# Patient Record
Sex: FEMALE | Race: WHITE | Employment: FULL TIME | URBAN - NONMETROPOLITAN AREA
[De-identification: names, ages, dates, MRNs, and addresses within clinical notes are randomized per-mention and may not be internally consistent; named-entity substitution may affect disease eponyms.]

---
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
## 2020-01-13 ENCOUNTER — TELEPHONE (OUTPATIENT)
Dept: PSYCHIATRY | Age: 20
End: 2020-01-13

## 2020-01-13 ENCOUNTER — OFFICE VISIT (OUTPATIENT)
Dept: FAMILY MEDICINE CLINIC | Age: 20
End: 2020-01-13
Payer: COMMERCIAL

## 2020-01-13 VITALS
HEIGHT: 66 IN | RESPIRATION RATE: 12 BRPM | TEMPERATURE: 98.9 F | HEART RATE: 90 BPM | BODY MASS INDEX: 32.14 KG/M2 | WEIGHT: 200 LBS | DIASTOLIC BLOOD PRESSURE: 80 MMHG | SYSTOLIC BLOOD PRESSURE: 138 MMHG

## 2020-01-13 PROCEDURE — 99213 OFFICE O/P EST LOW 20 MIN: CPT | Performed by: NURSE PRACTITIONER

## 2020-01-13 RX ORDER — PAROXETINE HYDROCHLORIDE 20 MG/1
20 TABLET, FILM COATED ORAL DAILY
Qty: 30 TABLET | Refills: 3 | Status: SHIPPED | OUTPATIENT
Start: 2020-01-13 | End: 2020-02-10 | Stop reason: SDUPTHER

## 2020-01-13 NOTE — PROGRESS NOTES
24 tablet 0     No current facility-administered medications for this visit. Orders Placed This Encounter   Medications    PARoxetine (PAXIL) 20 MG tablet     Sig: Take 1 tablet by mouth daily     Dispense:  30 tablet     Refill:  3         All medications reviewed and reconciled, including OTC and herbal medications. Updated list given to patient. Patient Active Problem List   Diagnosis    Major depressive disorder, recurrent episode, moderate (HCC)    Attention deficit hyperactivity disorder (ADHD), predominantly inattentive type    Generalized anxiety disorder       Past Medical History:   Diagnosis Date    ADHD (attention deficit hyperactivity disorder)     Anxiety     Attention deficit hyperactivity disorder (ADHD), predominantly inattentive type 1/16/2018    Blood in stool     Convergent comitant strabismus 2014    Diarrhea        No past surgical history on file.     No Known Allergies    Social History     Socioeconomic History    Marital status: Single     Spouse name: Not on file    Number of children: Not on file    Years of education: Not on file    Highest education level: Not on file   Occupational History    Not on file   Social Needs    Financial resource strain: Not on file    Food insecurity:     Worry: Not on file     Inability: Not on file    Transportation needs:     Medical: Not on file     Non-medical: Not on file   Tobacco Use    Smoking status: Never Smoker    Smokeless tobacco: Never Used   Substance and Sexual Activity    Alcohol use: No    Drug use: No    Sexual activity: Yes     Partners: Male     Comment: uses condoms   Lifestyle    Physical activity:     Days per week: Not on file     Minutes per session: Not on file    Stress: Not on file   Relationships    Social connections:     Talks on phone: Not on file     Gets together: Not on file     Attends Yarsanism service: Not on file     Active member of club or organization: Not on file     Attends meetings of clubs or organizations: Not on file     Relationship status: Not on file    Intimate partner violence:     Fear of current or ex partner: Not on file     Emotionally abused: Not on file     Physically abused: Not on file     Forced sexual activity: Not on file   Other Topics Concern    Not on file   Social History Narrative    Not on file       Family History   Problem Relation Age of Onset    Depression Mother     No Known Problems Father     Allergy (Severe) Maternal Grandmother     Other Maternal Grandmother         fibromyalgia    Depression Maternal Grandmother     Cancer Paternal Grandmother         Pancreatic     Colon Cancer Neg Hx     Colon Polyps Neg Hx          I have reviewed the patient's past medical history, past surgical history, allergies, medications, social and family history and I have made updates where appropriate.       Review of Systems  Positive responses are highlighted in bold    Constitutional:  Fever, Chills, Night Sweats, Fatigue, Unexpected changes in weight  Eyes:  Eye discharge, Eye pain, Eye redness, Visual disturbances   HENT:  Ear pain, Tinnitus, Nosebleeds, Trouble swallowing, Hearing loss, Sore throat  Cardiovascular:  Chest Pain, Palpitations, Orthopnea, Paroxysmal Nocturnal Dyspnea  Respiratory:  Cough, Wheezing, Shortness of breath, Chest tightness, Apnea  Gastrointestinal:  Nausea, Vomiting, Diarrhea, Constipation, Heartburn, Blood in stool  Genitourinary:  Difficulty or painful urination, Flank pain, Change in frequency, Urgency  Skin:  Color change, Rash, Itching, Wound  Psychiatric:  Hallucinations, Anxiety, Depression, Suicidal ideation  Hematological:  Enlarged glands, Easy bleeding, Easily bruising  Musculoskeletal:  Joint pain, Back pain, Gait problems, Joint swelling, Myalgias  Neurological:  Dizziness, Headaches, Presyncope, Numbness, Seizures, Tremors  Allergy:  Environmental allergies, Food allergies  Endocrine:  Heat Intolerance, Cold Intolerance, Polydipsia, Polyphagia, Polyuria      PHYSICAL EXAM:  Vitals:    01/13/20 1500   BP: 138/80   Pulse: 90   Resp: 12   Temp: 98.9 °F (37.2 °C)   TempSrc: Oral   Weight: 200 lb (90.7 kg)   Height: 5' 6\" (1.676 m)     Body mass index is 32.28 kg/m². VS Reviewed  General Appearance: A&O x 3, No acute distress,well developed and well- nourished  Head: normocephalic and atraumatic  Eyes: pupils equal, round, and reactive to light, extraocular eye movements intact, conjunctivae and eye lids without erythema  Neck: supple and non-tender without mass, no thyromegaly or thyroid nodules, no cervical lymphadenopathy  Pulmonary/Chest: clear to auscultation bilaterally- no wheezes, rales or rhonchi, normal air movement, no respiratory distress or retractions  Cardiovascular: S1 and S2 auscultated w/ RRR. No murmurs, rubs, clicks, or gallops, distal pulses intact. Abdomen: soft, non-tender, non-distended, bowl sounds physiologic,  no rebound or guarding, no masses or hernias noted. Liver and spleen without enlargement. Extremities: no cyanosis, clubbing or edema of the lower extremities  Musculoskeletal: No joint swelling or gross deformity   Neuro:  Alert, 5/5 strength globally and symmetrically  Psych: Affect and mood are flat. Thought process is normal without evidence of psychosis. Good insight and appropriate interaction. Cognition and memory appear to be intact. Skin: warm and dry, no rash or erythema  Lymph:  No cervical, auricular or supraclavicular lymph nodes palpated    ASSESSMENT & PLAN  Jolly Padilla was seen today for follow-up. Diagnoses and all orders for this visit:    Mood swings    Generalized anxiety disorder  -     PARoxetine (PAXIL) 20 MG tablet; Take 1 tablet by mouth daily    Severe episode of recurrent major depressive disorder, without psychotic features (HCC)  -     PARoxetine (PAXIL) 20 MG tablet;  Take 1 tablet by mouth daily      - not convinced she actually has mood disorder  -

## 2020-01-14 NOTE — TELEPHONE ENCOUNTER
I have attempted to leave a VM but there are calling restrictions and I was not able to leave a message; I will try again later.

## 2020-01-14 NOTE — TELEPHONE ENCOUNTER
Yes, I will only need 60 min for the new encounter with [facility] since Dr. Whelan Hasting evaluated her recently.   Thanks,  Electronically signed by Collette Catchings, MD on 1/14/2020 at 9:25 AM

## 2020-01-14 NOTE — TELEPHONE ENCOUNTER
Yes, I think Dr. Dru Durham and I have both already agreed to this.  Fine with me if it's fine with Dr. Dru Durham

## 2020-02-10 ENCOUNTER — OFFICE VISIT (OUTPATIENT)
Dept: FAMILY MEDICINE CLINIC | Age: 20
End: 2020-02-10
Payer: COMMERCIAL

## 2020-02-10 VITALS
RESPIRATION RATE: 10 BRPM | WEIGHT: 189 LBS | HEIGHT: 64 IN | SYSTOLIC BLOOD PRESSURE: 126 MMHG | DIASTOLIC BLOOD PRESSURE: 68 MMHG | TEMPERATURE: 98.5 F | BODY MASS INDEX: 32.27 KG/M2 | HEART RATE: 80 BPM

## 2020-02-10 PROCEDURE — 99213 OFFICE O/P EST LOW 20 MIN: CPT | Performed by: NURSE PRACTITIONER

## 2020-02-10 RX ORDER — PAROXETINE HYDROCHLORIDE 20 MG/1
20 TABLET, FILM COATED ORAL DAILY
Qty: 90 TABLET | Refills: 1 | Status: SHIPPED | OUTPATIENT
Start: 2020-02-10 | End: 2020-05-07

## 2020-02-10 NOTE — PROGRESS NOTES
Chief Complaint   Patient presents with    1 Month Follow-Up     anxiety- getting better       History obtained from chart review and the patient. SUBJECTIVE:  Cristino Wise is a 23 y.o. female that presents today for anxiety and depression      Anxiety/MDD    Mood swings are minimal. Anxiety flares up occasionally, but not bad. She denies any SI/HI. She feels like things are manageable. Depression is doing ok, denies feeling depressed. Energy and motivation are doing well. Appetite is fair, sleeping ok. Work and school are going well. Age/Gender Health Maintenance    Lipid - 35  DM Screen - 35  Colon Cancer Screening - 48  Lung Cancer Screening (Age 54 to [de-identified] with 30 pack year hx, current smoker or quit within past 15 years) - n/a    Tetanus - needs  Influenza Vaccine - 10/18  Pneumonia Vaccine - 65  Zostavax - 50   HPV Vaccine - needs    Breast Cancer Screening - 50  Cervical Cancer Screening - 21  Osteoporosis Screening - 65  Chlamydia Screen - 16    PSA testing discussion - n/a  AAA Screening - n/a    Falls screening - n/a    Current Outpatient Medications   Medication Sig Dispense Refill    PARoxetine (PAXIL) 20 MG tablet Take 1 tablet by mouth daily 90 tablet 1    ibuprofen (ADVIL;MOTRIN) 200 MG tablet Take 200 mg by mouth every 6 hours as needed for Pain      Naproxen Sodium (ALEVE PO) Take by mouth as needed      clonazePAM (KLONOPIN) 0.5 MG tablet Take 0.5 tablet to 1 full tablet daily prn for panic attacks. 5 tablet 0    naproxen (NAPROSYN) 500 MG tablet Take 1 tablet by mouth 2 times daily (with meals) for 24 doses 24 tablet 0     No current facility-administered medications for this visit. Orders Placed This Encounter   Medications    PARoxetine (PAXIL) 20 MG tablet     Sig: Take 1 tablet by mouth daily     Dispense:  90 tablet     Refill:  1         All medications reviewed and reconciled, including OTC and herbal medications. Updated list given to patient.        Patient normocephalic and atraumatic  Eyes: pupils equal, round, and reactive to light, extraocular eye movements intact, conjunctivae and eye lids without erythema  Neck: supple and non-tender without mass, no thyromegaly or thyroid nodules, no cervical lymphadenopathy  Pulmonary/Chest: clear to auscultation bilaterally- no wheezes, rales or rhonchi, normal air movement, no respiratory distress or retractions  Cardiovascular: S1 and S2 auscultated w/ RRR. No murmurs, rubs, clicks, or gallops, distal pulses intact. Abdomen: soft, non-tender, non-distended, bowl sounds physiologic,  no rebound or guarding, no masses or hernias noted. Liver and spleen without enlargement. Extremities: no cyanosis, clubbing or edema of the lower extremities  Musculoskeletal: No joint swelling or gross deformity   Neuro:  Alert, 5/5 strength globally and symmetrically  Psych: Affect and mood are flat. Thought process is normal without evidence of psychosis. Good insight and appropriate interaction. Cognition and memory appear to be intact. Skin: warm and dry, no rash or erythema  Lymph:  No cervical, auricular or supraclavicular lymph nodes palpated    ASSESSMENT & PLAN  Dharmesh Solomon was seen today for 1 month follow-up. Diagnoses and all orders for this visit:    Recurrent major depressive disorder, in full remission ([address]    Generalized anxiety disorder  -     PARoxetine (PAXIL) 20 MG tablet; Take 1 tablet by mouth daily      - anxiety and depression symptoms much improved and are stable  - con't Paxil 20 mg  - has appt with Psych this week      DISPOSITION    Return in about 6 months (around 8/10/2020) for anxiety and depression. Susan released without restrictions. PATIENT COUNSELING    Counseling was provided today regarding the following topics: Healthy eating habits, Regular exercise, substance abuse and healthy sleep habits.     Susan received counseling on the following healthy behaviors: nutrition    Patient given

## 2020-03-12 ENCOUNTER — OFFICE VISIT (OUTPATIENT)
Dept: FAMILY MEDICINE CLINIC | Age: 20
End: 2020-03-12
Payer: COMMERCIAL

## 2020-03-12 VITALS
BODY MASS INDEX: 29.47 KG/M2 | HEIGHT: 66 IN | SYSTOLIC BLOOD PRESSURE: 118 MMHG | HEART RATE: 88 BPM | TEMPERATURE: 98.7 F | WEIGHT: 183.4 LBS | DIASTOLIC BLOOD PRESSURE: 60 MMHG | RESPIRATION RATE: 16 BRPM

## 2020-03-12 PROCEDURE — 99213 OFFICE O/P EST LOW 20 MIN: CPT | Performed by: NURSE PRACTITIONER

## 2020-03-12 RX ORDER — AZITHROMYCIN 250 MG/1
250 TABLET, FILM COATED ORAL SEE ADMIN INSTRUCTIONS
Qty: 6 TABLET | Refills: 0 | Status: SHIPPED | OUTPATIENT
Start: 2020-03-12 | End: 2020-03-17

## 2020-05-06 ENCOUNTER — TELEPHONE (OUTPATIENT)
Dept: FAMILY MEDICINE CLINIC | Age: 20
End: 2020-05-06

## 2020-05-06 NOTE — TELEPHONE ENCOUNTER
Patient has been having trouble sleeping for the last week because of nightmares. Please advise.   [phone]

## 2020-05-07 ENCOUNTER — TELEMEDICINE (OUTPATIENT)
Dept: FAMILY MEDICINE CLINIC | Age: 20
End: 2020-05-07
Payer: COMMERCIAL

## 2020-05-07 PROCEDURE — 99214 OFFICE O/P EST MOD 30 MIN: CPT | Performed by: NURSE PRACTITIONER

## 2020-05-07 RX ORDER — CLONAZEPAM 0.5 MG/1
0.5 TABLET ORAL NIGHTLY PRN
Qty: 14 TABLET | Refills: 0 | Status: SHIPPED | OUTPATIENT
Start: 2020-05-07 | End: 2021-12-16 | Stop reason: ALTCHOICE

## 2020-05-07 RX ORDER — PAROXETINE 30 MG/1
30 TABLET, FILM COATED ORAL DAILY
Qty: 90 TABLET | Refills: 0 | Status: SHIPPED | OUTPATIENT
Start: 2020-05-07 | End: 2020-08-14 | Stop reason: SDUPTHER

## 2020-05-07 ASSESSMENT — ENCOUNTER SYMPTOMS
COUGH: 0
ALLERGIC/IMMUNOLOGIC NEGATIVE: 1
RHINORRHEA: 0
EYES NEGATIVE: 1
EYE REDNESS: 0
COLOR CHANGE: 0
RESPIRATORY NEGATIVE: 1
TROUBLE SWALLOWING: 0
DIARRHEA: 0
ABDOMINAL PAIN: 0
GASTROINTESTINAL NEGATIVE: 1
SHORTNESS OF BREATH: 0
SORE THROAT: 0
NAUSEA: 0
VOMITING: 0
EYE PAIN: 0
WHEEZING: 0

## 2020-05-07 NOTE — PROGRESS NOTES
2020    TELEHEALTH EVALUATION -- Audio/Visual (During GJPDY-90 public health emergency)    HPI:    Yakov Arellano (:  2000) has requested an audio/video evaluation for the following concern(s):    Night terrors  Started last week with some mild bad dreams and have progressively worsened over the last week to full blown night terrors. She reports that they were so bad she's couldn't go to sleep one night until 10 AM. She reports that the one she actually woke up into a panic attack. The one that was so bad she dreamt she and her family were in a bad car accident. Feels like her anxiety the past few weeks has gotten a little worse. She has been working on finals with school and so it has been a little stressful, but no major panic attacks. Focus/concentration has been good. Motivation/energy have been a little low. She has been feeling a little more depressed the last few weeks because she's been \"cooped\" up so much with quarantine with COVID. Feeling depressed pretty much 6 days/week, not necessarily the whole day, but parts of the day. Struggling with some suicidal thoughts maybe once a week. She will usually call her boyfriend and he talks her through it and calms her down. No plan. Review of Systems   Constitutional: Negative. Negative for chills, diaphoresis and fatigue. HENT: Negative. Negative for congestion, rhinorrhea, sore throat and trouble swallowing. Eyes: Negative. Negative for pain, redness and visual disturbance. Respiratory: Negative. Negative for cough, shortness of breath and wheezing. Cardiovascular: Negative. Negative for chest pain. Gastrointestinal: Negative. Negative for abdominal pain, diarrhea, nausea and vomiting. Endocrine: Negative. Negative for polydipsia, polyphagia and polyuria. Genitourinary: Negative. Negative for decreased urine volume, dysuria, frequency and urgency. Musculoskeletal: Negative. Negative for arthralgias and myalgias. inattentive type 1/16/2018    Blood in stool     Convergent comitant strabismus 2014    Diarrhea    , No past surgical history on file.,   Family History   Problem Relation Age of Onset    Depression Mother     No Known Problems Father     Allergy (Severe) Maternal Grandmother     Other Maternal Grandmother         fibromyalgia    Depression Maternal Grandmother     Cancer Paternal Grandmother         Pancreatic     Colon Cancer Neg Hx     Colon Polyps Neg Hx    ,   There is no immunization history on file for this patient.,   Health Maintenance   Topic Date Due    Varicella vaccine (1 of 2 - 2-dose childhood series) 10/12/2001    HPV vaccine (1 - 2-dose series) 10/12/2011    HIV screen  10/12/2015    Chlamydia screen  10/12/2016    DTaP/Tdap/Td vaccine (1 - Tdap) 10/12/2019    Flu vaccine (Season Ended) 09/01/2020    Hepatitis A vaccine  Aged Out    Hepatitis B vaccine  Aged Out    Hib vaccine  Aged Out    Meningococcal (ACWY) vaccine  Aged Out    Pneumococcal 0-64 years Vaccine  Aged Out       PHYSICAL EXAMINATION:  [ INSTRUCTIONS:  \"[x]\" Indicates a positive item  \"[]\" Indicates a negative item  -- DELETE ALL ITEMS NOT EXAMINED]  Vital Signs: (As obtained by patient/caregiver or practitioner observation)    Blood pressure-  Heart rate-    Respiratory rate-    Temperature-  Pulse oximetry-     Constitutional: [x] Appears well-developed and well-nourished [x] No apparent distress      [] Abnormal-   Mental status  [x] Alert and awake  [x] Oriented to person/place/time [x]Able to follow commands      Eyes:  EOM    [x]  Normal  [] Abnormal-  Sclera  [x]  Normal  [] Abnormal -         Discharge [x]  None visible  [] Abnormal -    HENT:   [x] Normocephalic, atraumatic.   [] Abnormal   [x] Mouth/Throat: Mucous membranes are moist.     External Ears [x] Normal  [] Abnormal-     Neck: [x] No visualized mass     Pulmonary/Chest: [x] Respiratory effort normal.  [x] No visualized signs of difficulty breathing or respiratory distress        [] Abnormal-      Musculoskeletal:   [x] Normal gait with no signs of ataxia         [x] Normal range of motion of neck        [] Abnormal-       Neurological:        [x] No Facial Asymmetry (Cranial nerve 7 motor function) (limited exam to video visit)          [x] No gaze palsy        [] Abnormal-         Skin:        [x] No significant exanthematous lesions or discoloration noted on facial skin         [] Abnormal-            Psychiatric:       [] Normal Affect [x] No Hallucinations        [x] Abnormal- flat affect    Other pertinent observable physical exam findings-     ASSESSMENT/PLAN:  1. Generalized anxiety disorder  - increase dose of Paxil add short term Rx for Klonopin for sleep/night terrors etc  - PARoxetine (PAXIL) 30 MG tablet; Take 1 tablet by mouth daily  Dispense: 90 tablet; Refill: 0  - clonazePAM (KLONOPIN) 0.5 MG tablet; Take 1 tablet by mouth nightly as needed (panic attack and/or sleep) for up to 14 days. Dispense: 14 tablet; Refill: 0    2. Major depressive disorder, recurrent episode, moderate (HCC)  - increase dose of Paxil to 30 mg  - suicidal precautions given  - discussed referral back to [facility] for counseling, but she declines at this time  - PARoxetine (PAXIL) 30 MG tablet; Take 1 tablet by mouth daily  Dispense: 90 tablet; Refill: 0    3. Night terrors  - clonazePAM (KLONOPIN) 0.5 MG tablet; Take 1 tablet by mouth nightly as needed (panic attack and/or sleep) for up to 14 days. Dispense: 14 tablet; Refill: 0    Controlled Substance Monitoring:    Acute and Chronic Pain Monitoring:   RX Monitoring 5/7/2020   Attestation -   Periodic Controlled Substance Monitoring No signs of potential drug abuse or diversion identified. Return in about 4 weeks (around 6/4/2020) for MDD, anxiety, night terrors. Shyanne Edwards is a 23 y.o. female being evaluated by a Virtual Visit (video visit) encounter to address concerns as mentioned above.   SOFIYA

## 2020-05-15 ENCOUNTER — HOSPITAL ENCOUNTER (OUTPATIENT)
Age: 20
Discharge: HOME | End: 2020-05-15
Payer: COMMERCIAL

## 2020-05-15 LAB
ANION GAP SERPL CALCULATED.3IONS-SCNC: 12 MEQ/L (ref 8–16)
AVERAGE GLUCOSE: 114 MG/DL (ref 70–126)
BUN BLDV-MCNC: 15 MG/DL (ref 7–22)
CALCIUM SERPL-MCNC: 9 MG/DL (ref 8.5–10.5)
CHLORIDE BLD-SCNC: 101 MEQ/L (ref 98–111)
CO2: 26 MEQ/L (ref 23–33)
CREAT SERPL-MCNC: 0.7 MG/DL (ref 0.4–1.2)
GLUCOSE BLD-MCNC: 96 MG/DL (ref 70–108)
HBA1C MFR BLD: 5.8 % (ref 4.4–6.4)
POTASSIUM SERPL-SCNC: 4.3 MEQ/L (ref 3.5–5.2)
SODIUM BLD-SCNC: 139 MEQ/L (ref 135–145)

## 2020-05-15 PROCEDURE — 83036 HEMOGLOBIN GLYCOSYLATED A1C: CPT

## 2020-05-15 PROCEDURE — 83525 ASSAY OF INSULIN: CPT

## 2020-05-15 PROCEDURE — 80048 BASIC METABOLIC PNL TOTAL CA: CPT

## 2020-05-15 PROCEDURE — 36415 COLL VENOUS BLD VENIPUNCTURE: CPT

## 2020-05-16 LAB — INSULIN, RANDOM OR FASTING: NORMAL

## 2020-08-14 RX ORDER — PAROXETINE 30 MG/1
TABLET, FILM COATED ORAL
Qty: 90 TABLET | Refills: 0 | Status: SHIPPED | OUTPATIENT
Start: 2020-08-14 | End: 2021-02-02 | Stop reason: ALTCHOICE

## 2020-10-20 ENCOUNTER — VIRTUAL VISIT (OUTPATIENT)
Dept: FAMILY MEDICINE CLINIC | Age: 20
End: 2020-10-20
Payer: COMMERCIAL

## 2020-10-20 PROCEDURE — 99213 OFFICE O/P EST LOW 20 MIN: CPT | Performed by: NURSE PRACTITIONER

## 2020-10-20 RX ORDER — VENLAFAXINE HYDROCHLORIDE 37.5 MG/1
37.5 CAPSULE, EXTENDED RELEASE ORAL DAILY
Qty: 30 CAPSULE | Refills: 2 | Status: SHIPPED | OUTPATIENT
Start: 2020-10-20 | End: 2021-02-02 | Stop reason: ALTCHOICE

## 2020-10-20 ASSESSMENT — ENCOUNTER SYMPTOMS
ABDOMINAL PAIN: 0
COLOR CHANGE: 0
VOMITING: 0
TROUBLE SWALLOWING: 0
DIARRHEA: 0
EYE REDNESS: 0
SORE THROAT: 0
SHORTNESS OF BREATH: 0
NAUSEA: 0
RHINORRHEA: 0
EYE PAIN: 0
COUGH: 0
WHEEZING: 0

## 2020-10-20 NOTE — PROGRESS NOTES
10/20/2020    TELEHEALTH EVALUATION -- Audio/Visual (During EEAVK-91 public health emergency)    HPI:    Zeke Lee (:  2000) has requested an audio/video evaluation for the following concern(s):    MDD  Feeling more depressed since about July. Her parents moved, her dad got a job in [facility]. She is still living in [facility], going to college at [employer]. School has been stressful. Feeling depressed pretty much daily. When she's not feeling depressed, she feels guilty for being happy. Energy and motivation are poor. She just wants to sleep. Focus/concentration is fair. She struggles falling asleep. Appetite has been ok. Denies any SI/HI. She does have adequate emotional support, she has friends and boyfriend. She was taking Paxil 30 mg, but she felt like it stopped working. So she stopped it as of August.    Also struggling with anxiety, triggers are mostly school and work. She tried setting up counseling, but due to work and school schedule she is very limited. Review of Systems   Constitutional: Positive for fatigue. Negative for chills and diaphoresis. HENT: Negative for congestion, rhinorrhea, sore throat and trouble swallowing. Eyes: Negative for pain, redness and visual disturbance. Respiratory: Negative for cough, shortness of breath and wheezing. Cardiovascular: Negative for chest pain, palpitations and leg swelling. Gastrointestinal: Negative for abdominal pain, diarrhea, nausea and vomiting. Endocrine: Negative for polydipsia, polyphagia and polyuria. Genitourinary: Negative for decreased urine volume, dysuria, frequency and urgency. Musculoskeletal: Negative for arthralgias and myalgias. Skin: Negative for color change and rash. Allergic/Immunologic: Negative for environmental allergies, food allergies and immunocompromised state. Neurological: Negative for dizziness, tremors, syncope and headaches. Hematological: Negative. Negative for adenopathy.    Psychiatric/Behavioral: Positive for decreased concentration and sleep disturbance. Negative for behavioral problems, confusion, self-injury and suicidal ideas. The patient is nervous/anxious. All other systems reviewed and are negative. Prior to Visit Medications    Medication Sig Taking? Authorizing Provider   venlafaxine (EFFEXOR XR) 37.5 MG extended release capsule Take 1 capsule by mouth daily Yes ALBERTA Jaimes CNP   PARoxetine (PAXIL) 30 MG tablet Take 1 tablet by mouth once daily  ALBERTA Jaimes CNP   clonazePAM (KLONOPIN) 0.5 MG tablet Take 1 tablet by mouth nightly as needed (panic attack and/or sleep) for up to 14 days.   ALBERTA Jaimes CNP   ibuprofen (ADVIL;MOTRIN) 200 MG tablet Take 200 mg by mouth every 6 hours as needed for Pain  Historical Provider, MD   Naproxen Sodium (ALEVE PO) Take by mouth as needed  Historical Provider, MD   naproxen (NAPROSYN) 500 MG tablet Take 1 tablet by mouth 2 times daily (with meals) for 24 doses  Ofelia Chahal MD   escitalopram (LEXAPRO) 10 MG tablet Take 1 tablet by mouth daily  ALBERTA Jaimes CNP   SUMAtriptan (IMITREX) 50 MG tablet Take 1 tablet by mouth once as needed for Migraine  ALBERTA Jaimes CNP   propranolol (INDERAL LA) 60 MG extended release capsule Take 1 capsule by mouth daily  ALBERTA Jaimes CNP   amitriptyline (ELAVIL) 10 MG tablet Take 1 tablet by mouth nightly  ALBERTA Jaimes CNP   FLUoxetine (PROZAC) 40 MG capsule Take 1 capsule by mouth daily  ALBERTA Jaimes CNP       Social History     Tobacco Use    Smoking status: Never Smoker    Smokeless tobacco: Never Used   Substance Use Topics    Alcohol use: No    Drug use: No        No Known Allergies,   Past Medical History:   Diagnosis Date    ADHD (attention deficit hyperactivity disorder)     Anxiety     Attention deficit hyperactivity disorder (ADHD), predominantly inattentive type 1/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    Blood in stool     Convergent comitant strabismus 2014    Diarrhea    , No past surgical history on file.,   Family History   Problem Relation Age of Onset    Depression Mother     No Known Problems Father     Allergy (Severe) Maternal Grandmother     Other Maternal Grandmother         fibromyalgia    Depression Maternal Grandmother     Cancer Paternal Grandmother         Pancreatic     Colon Cancer Neg Hx     Colon Polyps Neg Hx    ,   There is no immunization history on file for this patient.,   Health Maintenance   Topic Date Due    Varicella vaccine (1 of 2 - 2-dose childhood series) 10/12/2001    HPV vaccine (1 - 2-dose series) 10/12/2011    HIV screen  10/12/2015    Chlamydia screen  10/12/2016    DTaP/Tdap/Td vaccine (1 - Tdap) 10/12/2019    Flu vaccine (1) 09/01/2020    A1C test (Diabetic or Prediabetic)  05/15/2021    Hepatitis A vaccine  Aged Out    Hepatitis B vaccine  Aged Out    Hib vaccine  Aged Out    Meningococcal (ACWY) vaccine  Aged Out    Pneumococcal 0-64 years Vaccine  Aged Out       PHYSICAL EXAMINATION:  [ INSTRUCTIONS:  \"[x]\" Indicates a positive item  \"[]\" Indicates a negative item  -- DELETE ALL ITEMS NOT EXAMINED]  Vital Signs: (As obtained by patient/caregiver or practitioner observation)    Blood pressure-  Heart rate-    Respiratory rate-    Temperature-  Pulse oximetry-     Constitutional: [x] Appears well-developed and well-nourished [x] No apparent distress      [] Abnormal-   Mental status  [x] Alert and awake  [x] Oriented to person/place/time [x]Able to follow commands      Eyes:  EOM    [x]  Normal  [] Abnormal-  Sclera  [x]  Normal  [] Abnormal -         Discharge [x]  None visible  [] Abnormal -    HENT:   [x] Normocephalic, atraumatic.   [] Abnormal   [x] Mouth/Throat: Mucous membranes are moist.     External Ears [x] Normal  [] Abnormal-     Neck: [x] No visualized mass     Pulmonary/Chest: [x] Respiratory effort normal.  [x] No visualized signs of difficulty breathing or respiratory distress        [] Abnormal-      Musculoskeletal:   [x] Normal gait with no signs of ataxia         [x] Normal range of motion of neck        [] Abnormal-       Neurological:        [x] No Facial Asymmetry (Cranial nerve 7 motor function) (limited exam to video visit)          [x] No gaze palsy        [] Abnormal-         Skin:        [x] No significant exanthematous lesions or discoloration noted on facial skin         [] Abnormal-            Psychiatric:       [x] Normal Affect [x] No Hallucinations        [] Abnormal-     Other pertinent observable physical exam findings-     ASSESSMENT & PLAN  Diagnoses and all orders for this visit:    Generalized anxiety disorder  -     venlafaxine (EFFEXOR XR) 37.5 MG extended release capsule; Take 1 capsule by mouth daily    Severe episode of recurrent major depressive disorder, without psychotic features (HCC)  -     venlafaxine (EFFEXOR XR) 37.5 MG extended release capsule; Take 1 capsule by mouth daily      - start Effexor for anxiety/depression  - suicidal precautions given  - f/u 4 weeks    Return in about 4 weeks (around 11/17/2020) for anxiety and depression. Bartholome Closs is a 21 y.o. female being evaluated by a Virtual Visit (video visit) encounter to address concerns as mentioned above. A caregiver was present when appropriate. Due to this being a TeleHealth encounter (During [place] [address] public [facility] emergency), evaluation of the following organ systems was limited: Vitals/Constitutional/EENT/Resp/CV/GI//MS/Neuro/Skin/Heme-Lymph-Imm. Pursuant to the emergency declaration under the [facility], [address] [place] [address] authority and the [employer] Act, this Virtual Visit was conducted with patient's (and/or legal guardian's) consent, to reduce the patient's risk of exposure to COVID-19 and provide necessary medical care.   The patient (and/or legal guardian) has also been advised to contact this office for worsening conditions or problems, and seek emergency medical treatment and/or call 911 if deemed necessary. Services were provided through a video synchronous discussion virtually to substitute for in-person clinic visit. Patient and provider were located at their individual homes. --ALBERTA Slade - CNP on 10/20/2020 at 11:38 AM    An electronic signature was used to authenticate this note.

## 2021-02-02 ENCOUNTER — APPOINTMENT (OUTPATIENT)
Dept: GENERAL RADIOLOGY | Age: 21
End: 2021-02-02
Payer: COMMERCIAL

## 2021-02-02 ENCOUNTER — OFFICE VISIT (OUTPATIENT)
Dept: FAMILY MEDICINE CLINIC | Age: 21
End: 2021-02-02
Payer: COMMERCIAL

## 2021-02-02 ENCOUNTER — HOSPITAL ENCOUNTER (EMERGENCY)
Age: 21
Discharge: HOME | End: 2021-02-02
Attending: EMERGENCY MEDICINE
Payer: COMMERCIAL

## 2021-02-02 VITALS
HEART RATE: 103 BPM | OXYGEN SATURATION: 99 % | SYSTOLIC BLOOD PRESSURE: 114 MMHG | WEIGHT: 172 LBS | TEMPERATURE: 98.7 F | BODY MASS INDEX: 28.66 KG/M2 | DIASTOLIC BLOOD PRESSURE: 76 MMHG | RESPIRATION RATE: 14 BRPM | HEIGHT: 65 IN

## 2021-02-02 VITALS
DIASTOLIC BLOOD PRESSURE: 74 MMHG | OXYGEN SATURATION: 100 % | WEIGHT: 172 LBS | BODY MASS INDEX: 28.66 KG/M2 | TEMPERATURE: 98.5 F | HEIGHT: 65 IN | RESPIRATION RATE: 18 BRPM | SYSTOLIC BLOOD PRESSURE: 121 MMHG | HEART RATE: 80 BPM

## 2021-02-02 DIAGNOSIS — F41.1 GENERALIZED ANXIETY DISORDER: Primary | ICD-10-CM

## 2021-02-02 DIAGNOSIS — S60.221A CONTUSION OF RIGHT HAND, INITIAL ENCOUNTER: Primary | ICD-10-CM

## 2021-02-02 DIAGNOSIS — S67.21XA CRUSHING INJURY OF RIGHT HAND, INITIAL ENCOUNTER: ICD-10-CM

## 2021-02-02 DIAGNOSIS — F39 MOOD DISORDER (HCC): ICD-10-CM

## 2021-02-02 PROCEDURE — 6360000002 HC RX W HCPCS: Performed by: EMERGENCY MEDICINE

## 2021-02-02 PROCEDURE — 96374 THER/PROPH/DIAG INJ IV PUSH: CPT

## 2021-02-02 PROCEDURE — 6370000000 HC RX 637 (ALT 250 FOR IP): Performed by: EMERGENCY MEDICINE

## 2021-02-02 PROCEDURE — 90714 TD VACC NO PRESV 7 YRS+ IM: CPT | Performed by: EMERGENCY MEDICINE

## 2021-02-02 PROCEDURE — 96375 TX/PRO/DX INJ NEW DRUG ADDON: CPT

## 2021-02-02 PROCEDURE — 99285 EMERGENCY DEPT VISIT HI MDM: CPT

## 2021-02-02 PROCEDURE — 90471 IMMUNIZATION ADMIN: CPT | Performed by: EMERGENCY MEDICINE

## 2021-02-02 PROCEDURE — 73130 X-RAY EXAM OF HAND: CPT

## 2021-02-02 PROCEDURE — 99213 OFFICE O/P EST LOW 20 MIN: CPT | Performed by: NURSE PRACTITIONER

## 2021-02-02 RX ORDER — CEPHALEXIN 500 MG/1
500 CAPSULE ORAL 3 TIMES DAILY
Qty: 30 CAPSULE | Refills: 0 | Status: SHIPPED | OUTPATIENT
Start: 2021-02-02 | End: 2021-02-12

## 2021-02-02 RX ORDER — CEPHALEXIN 250 MG/1
500 CAPSULE ORAL ONCE
Status: COMPLETED | OUTPATIENT
Start: 2021-02-02 | End: 2021-02-02

## 2021-02-02 RX ORDER — IBUPROFEN 600 MG/1
600 TABLET ORAL EVERY 6 HOURS PRN
Qty: 40 TABLET | Refills: 0 | Status: SHIPPED | OUTPATIENT
Start: 2021-02-02 | End: 2021-12-16 | Stop reason: ALTCHOICE

## 2021-02-02 RX ORDER — HYDROCODONE BITARTRATE AND ACETAMINOPHEN 5; 325 MG/1; MG/1
1 TABLET ORAL EVERY 6 HOURS PRN
Qty: 10 TABLET | Refills: 0 | Status: SHIPPED | OUTPATIENT
Start: 2021-02-02 | End: 2021-02-05

## 2021-02-02 RX ORDER — ARIPIPRAZOLE 2 MG/1
2 TABLET ORAL DAILY
Qty: 30 TABLET | Refills: 3 | Status: SHIPPED | OUTPATIENT
Start: 2021-02-02 | End: 2021-04-05

## 2021-02-02 RX ORDER — KETOROLAC TROMETHAMINE 30 MG/ML
30 INJECTION, SOLUTION INTRAMUSCULAR; INTRAVENOUS ONCE
Status: COMPLETED | OUTPATIENT
Start: 2021-02-02 | End: 2021-02-02

## 2021-02-02 RX ORDER — MORPHINE SULFATE 2 MG/ML
2 INJECTION, SOLUTION INTRAMUSCULAR; INTRAVENOUS ONCE
Status: COMPLETED | OUTPATIENT
Start: 2021-02-02 | End: 2021-02-02

## 2021-02-02 RX ADMIN — KETOROLAC TROMETHAMINE 30 MG: 30 INJECTION, SOLUTION INTRAMUSCULAR at 18:59

## 2021-02-02 RX ADMIN — CLOSTRIDIUM TETANI TOXOID ANTIGEN (FORMALDEHYDE INACTIVATED) AND CORYNEBACTERIUM DIPHTHERIAE TOXOID ANTIGEN (FORMALDEHYDE INACTIVATED) 0.5 ML: 5; 2 INJECTION, SUSPENSION INTRAMUSCULAR at 19:02

## 2021-02-02 RX ADMIN — MORPHINE SULFATE 2 MG: 2 INJECTION, SOLUTION INTRAMUSCULAR; INTRAVENOUS at 18:59

## 2021-02-02 RX ADMIN — CEPHALEXIN 500 MG: 250 CAPSULE ORAL at 18:58

## 2021-02-02 ASSESSMENT — ENCOUNTER SYMPTOMS
NAUSEA: 0
BACK PAIN: 0
ABDOMINAL PAIN: 0
EYE REDNESS: 0
SHORTNESS OF BREATH: 0
COUGH: 0
VOMITING: 0
TROUBLE SWALLOWING: 0

## 2021-02-02 ASSESSMENT — PAIN SCALES - GENERAL: PAINLEVEL_OUTOF10: 10

## 2021-02-02 ASSESSMENT — PAIN DESCRIPTION - PAIN TYPE: TYPE: ACUTE PAIN

## 2021-02-02 ASSESSMENT — PAIN DESCRIPTION - FREQUENCY: FREQUENCY: CONTINUOUS

## 2021-02-02 ASSESSMENT — PAIN DESCRIPTION - ONSET: ONSET: SUDDEN

## 2021-02-02 NOTE — ED PROVIDER NOTES
[address] [address] EMERGENCY DEPT    EMERGENCY MEDICINE     Pt Name: Pramod Kendall  MRN: [id number]  [place] 2000  Date of evaluation: 2/2/2021  Provider: Jennifer Santana MD,     CHIEF COMPLAINT       Chief Complaint   Patient presents with    Hand Injury       HISTORY OF PRESENT ILLNESS    Pramod Kendall is a pleasant 21 y.o. female who presents to the emergency department from work for a hand injury. Patient states that her right hand became entrapped in a conveyor belt. She has had swelling and pain to the middle and ring finger. The pain is throbbing in nature. It is limited to her hand and fingers. She rates it a 10 out of 10 for pain. patient states the machine stopped when her hand became entrapped in the conveyor belt. She is able to move her fingers though it is very painful. She denies any blood thinners. He was given morphine in EMS which helped a little bit with her pain. Triage notes and Nursing notes were reviewed by myself. Any discrepancies are addressed above. PAST MEDICAL HISTORY     Past Medical History:   Diagnosis Date    ADHD (attention deficit hyperactivity disorder)     Anxiety     Attention deficit hyperactivity disorder (ADHD), predominantly inattentive type 1/16/2018    Blood in stool     Convergent comitant strabismus 2014    Diarrhea        SURGICAL HISTORY     History reviewed. No pertinent surgical history. CURRENT MEDICATIONS       Discharge Medication List as of 2/2/2021  6:48 PM      CONTINUE these medications which have NOT CHANGED    Details   NONFORMULARY Birth controlHistorical Med      ARIPiprazole (ABILIFY) 2 MG tablet Take 1 tablet by mouth daily, Disp-30 tablet, R-3Normal      clonazePAM (KLONOPIN) 0.5 MG tablet Take 1 tablet by mouth nightly as needed (panic attack and/or sleep) for up to 14 days. , Disp-14 tablet, R-0Normal      !! ibuprofen (ADVIL;MOTRIN) 200 MG tablet Take 200 mg by mouth every 6 hours as needed for PainHistorical Med Naproxen Sodium (ALEVE PO) Take by mouth as neededHistorical Med      naproxen (NAPROSYN) 500 MG tablet Take 1 tablet by mouth 2 times daily (with meals) for 24 doses, Disp-24 tablet, R-0Print       !! - Potential duplicate medications found. Please discuss with provider. ALLERGIES     Patient has no known allergies.     FAMILY HISTORY       Family History   Problem Relation Age of Onset    Depression Mother     No Known Problems Father     Allergy (Severe) Maternal Grandmother     Other Maternal Grandmother         fibromyalgia    Depression Maternal Grandmother     Cancer Paternal Grandmother         Pancreatic     Colon Cancer Neg Hx     Colon Polyps Neg Hx         SOCIAL HISTORY       Social History     Socioeconomic History    Marital status: Single     Spouse name: None    Number of children: None    Years of education: None    Highest education level: None   Occupational History    None   Social Needs    Financial resource strain: None    Food insecurity     Worry: None     Inability: None    Transportation needs     Medical: None     Non-medical: None   Tobacco Use    Smoking status: Never Smoker    Smokeless tobacco: Never Used   Substance and Sexual Activity    Alcohol use: No    Drug use: No    Sexual activity: Yes     Partners: Male     Comment: uses condoms   Lifestyle    Physical activity     Days per week: None     Minutes per session: None    Stress: None   Relationships    Social connections     Talks on phone: None     Gets together: None     Attends [employer] service: None     Active member of club or organization: None     Attends meetings of clubs or organizations: None     Relationship status: None    Intimate partner violence     Fear of current or ex partner: None     Emotionally abused: None     Physically abused: None     Forced sexual activity: None   Other Topics Concern    None   Social History Narrative    None       REVIEW OF SYSTEMS     Review of Systems   Constitutional: Negative for fatigue and fever. HENT: Negative for congestion and trouble swallowing. Eyes: Negative for redness. Respiratory: Negative for cough and shortness of breath. Cardiovascular: Negative for chest pain. Gastrointestinal: Negative for abdominal pain, nausea and vomiting. Genitourinary: Negative for dysuria. Musculoskeletal: Positive for arthralgias and joint swelling. Negative for back pain. Skin: Positive for wound. Negative for rash. Allergic/Immunologic: Negative for immunocompromised state. Neurological: Negative for light-headedness. Hematological: Does not bruise/bleed easily. Except as noted above the remainder of the review of systems was reviewed and is. PHYSICAL EXAM    (up to 7 for level 4, 8 or more for level 5)     ED Triage Vitals [02/02/21 1713]   BP Temp Temp Source Pulse Resp SpO2 Height Weight   119/74 98.5 °F (36.9 °C) Oral 80 16 100 % 5' 5\" (1.651 m) 172 lb (78 kg)       Physical Exam  Vitals signs and nursing note reviewed. Exam conducted with a chaperone present. Constitutional:       General: She is not in acute distress. Appearance: She is normal weight. She is not ill-appearing. HENT:      Head: Normocephalic and atraumatic. Nose: Nose normal. No congestion. Mouth/Throat:      Mouth: Mucous membranes are moist.      Pharynx: Oropharynx is clear. No oropharyngeal exudate or posterior oropharyngeal erythema. Eyes:      Extraocular Movements: Extraocular movements intact. Pupils: Pupils are equal, round, and reactive to light. Cardiovascular:      Rate and Rhythm: Normal rate and regular rhythm. Pulses: Normal pulses. Radial pulses are 2+ on the right side and 2+ on the left side. Heart sounds: No murmur. No friction rub. Pulmonary:      Effort: Pulmonary effort is normal. No respiratory distress. Breath sounds: Normal breath sounds. No wheezing.    Abdominal:      General: Abdomen is flat. There is no distension. Palpations: Abdomen is soft. Tenderness: There is no abdominal tenderness. There is no guarding or rebound. Musculoskeletal:      Right wrist: She exhibits normal range of motion, no tenderness, no bony tenderness and no swelling. Right hand: She exhibits decreased range of motion, tenderness, bony tenderness and swelling. She exhibits normal capillary refill. Normal sensation noted. Normal strength noted. Skin:     General: Skin is warm and dry. Capillary Refill: Capillary refill takes less than 2 seconds. Neurological:      General: No focal deficit present. Mental Status: She is alert and oriented to person, place, and time. DIAGNOSTIC RESULTS     EKG:(none if blank)  All E[facility]'s are interpreted by [facility] who either signs or Co-signs this chart in the absence of a cardiologist.        RADIOLOGY: (none if blank)   Interpretation per the Radiologistbelow, if available at the time of this note:    XR HAND RIGHT (MIN 3 VIEWS)   Final Result   Soft tissue swelling third digit right hand adjacent to the PIP joint small focus of air correlate for penetrating injury versus infection. No osseous involvement. No fracture or dislocation. No radiopaque foreign body. **This report has been created using voice recognition software. It may contain minor errors which are inherent in voice recognition technology. **      Final report electronically signed by Dr. Pam Hernandez on 2/2/2021 6:25 PM          LABS:  Labs Reviewed - No data to display    All other labs were within normal range or not returned as of this dictation. Please note, any cultures that may have been sent were not resulted at the time of this patient visit.     EMERGENCY DEPARTMENT COURSE andMedical Decision Making:     MDM  Number of Diagnoses or Management Options  Contusion of right hand, initial encounter  Crushing injury of right hand, initial If symptoms worsen      DISCHARGE MEDICATIONS:  Discharge Medication List as of 2/2/2021  6:48 PM      START taking these medications    Details   cephALEXin (KEFLEX) 500 MG capsule Take 1 capsule by mouth 3 times daily for 10 days, Disp-30 capsule, R-0Normal      HYDROcodone-acetaminophen (NORCO) 5-325 MG per tablet Take 1 tablet by mouth every 6 hours as needed for Pain for up to 3 days. Intended supply: 3 days. Take lowest dose possible to manage pain, Disp-10 tablet, R-0Print      !! ibuprofen (IBU) 600 MG tablet Take 1 tablet by mouth every 6 hours as needed for Pain, Disp-40 tablet, R-0Normal       !! - Potential duplicate medications found. Please discuss with provider.                  (Please note that portions of this note were completed with a voice recognition program.  Efforts were made to edit the dictations but occasionallywords are mis-transcribed.)      Electronically signed by Berenice De Souza MD on 2/2/21 at 6:27 PM EST    Attending Physician, Emergency Department       Chaparro Khan MD  02/03/21 7942

## 2021-02-02 NOTE — PROGRESS NOTES
Chief Complaint   Patient presents with    Depression     still dealing with depression, worsening since Sept        History obtained from chart review and the patient. SUBJECTIVE:  Tony Sanchez is a 21 y.o. female that presents today for anxiety and depression      Anxiety/MDD    Mood swings are all over the place, feels like her moods are feeling \"neutral\" or feeling normal, and then feeling extremely mad or feeling very depressed. She will also feel extremely happy and then very sad/depressed. Feels like she hates someone and then the next minute she loves them. She does admit to feeling depressed about 4 days/week. Motivation/energy are poor. A lot of irritability. She does get suicidal thoughts probably 3 times/week, but no plan. She feels like the frequency of the suicidal thoughts have improved. Focus/concentration are ok. Appetite is good, sleep isn't great. Anxiety isn't doing well either, not the worst it's ever been, but not the best  She did take the Effexor for about 1 month, but didn't notice much improvement.     Age/Gender Health Maintenance    Lipid - 35  DM Screen - 35  Colon Cancer Screening - 48  Lung Cancer Screening (Age 54 to [de-identified] with 30 pack year hx, current smoker or quit within past 15 years) - n/a    Tetanus - needs  Influenza Vaccine - 10/18  Pneumonia Vaccine - 65  Zostavax - 50   HPV Vaccine - needs    Breast Cancer Screening - 50  Cervical Cancer Screening - 21  Osteoporosis Screening - 65  Chlamydia Screen - 16    PSA testing discussion - n/a  AAA Screening - n/a    Falls screening - n/a    Current Outpatient Medications   Medication Sig Dispense Refill    NONFORMULARY Birth control      ARIPiprazole (ABILIFY) 2 MG tablet Take 1 tablet by mouth daily 30 tablet 3    ibuprofen (ADVIL;MOTRIN) 200 MG tablet Take 200 mg by mouth every 6 hours as needed for Pain      Naproxen Sodium (ALEVE PO) Take by mouth as needed      clonazePAM (KLONOPIN) 0.5 MG tablet Take 1 tablet by mouth nightly as needed (panic attack and/or sleep) for up to 14 days. 14 tablet 0    naproxen (NAPROSYN) 500 MG tablet Take 1 tablet by mouth 2 times daily (with meals) for 24 doses 24 tablet 0     No current facility-administered medications for this visit. Orders Placed This Encounter   Medications    ARIPiprazole (ABILIFY) 2 MG tablet     Sig: Take 1 tablet by mouth daily     Dispense:  30 tablet     Refill:  3         All medications reviewed and reconciled, including OTC and herbal medications. Updated list given to patient. Patient Active Problem List   Diagnosis    Major depressive disorder, recurrent episode, moderate (HCC)    Attention deficit hyperactivity disorder (ADHD), predominantly inattentive type    Generalized anxiety disorder    Mood disorder ([facility] [address]       Past Medical History:   Diagnosis Date    ADHD (attention deficit hyperactivity disorder)     Anxiety     Attention deficit hyperactivity disorder (ADHD), predominantly inattentive type 1/16/2018    Blood in stool     Convergent comitant strabismus 2014    Diarrhea        History reviewed. No pertinent surgical history.     No Known Allergies    Social History     Socioeconomic History    Marital status: Single     Spouse name: Not on file    Number of children: Not on file    Years of education: Not on file    Highest education level: Not on file   Occupational History    Not on file   Social Needs    Financial resource strain: Not on file    Food insecurity     Worry: Not on file     Inability: Not on file    Transportation needs     Medical: Not on file     Non-medical: Not on file   Tobacco Use    Smoking status: Never Smoker    Smokeless tobacco: Never Used   Substance and Sexual Activity    Alcohol use: No    Drug use: No    Sexual activity: Yes     Partners: Male     Comment: uses condoms   Lifestyle    Physical activity     Days per week: Not on file     Minutes per session: Not on file    Stress: Not on file   Relationships    Social connections     Talks on phone: Not on file     Gets together: Not on file     Attends [employer] service: Not on file     Active member of club or organization: Not on file     Attends meetings of clubs or organizations: Not on file     Relationship status: Not on file    Intimate partner violence     Fear of current or ex partner: Not on file     Emotionally abused: Not on file     Physically abused: Not on file     Forced sexual activity: Not on file   Other Topics Concern    Not on file   Social History Narrative    Not on file       Family History   Problem Relation Age of Onset    Depression Mother     No Known Problems Father     Allergy (Severe) Maternal Grandmother     Other Maternal Grandmother         fibromyalgia    Depression Maternal Grandmother     Cancer Paternal Grandmother         Pancreatic     Colon Cancer Neg Hx     Colon Polyps Neg Hx          I have reviewed the patient's past medical history, past surgical history, allergies, medications, social and family history and I have made updates where appropriate.       Review of Systems  Positive responses are highlighted in bold    Constitutional:  Fever, Chills, Night Sweats, Fatigue, Unexpected changes in weight  Eyes:  Eye discharge, Eye pain, Eye redness, Visual disturbances   HENT:  Ear pain, Tinnitus, Nosebleeds, Trouble swallowing, Hearing loss, Sore throat  Cardiovascular:  Chest Pain, Palpitations, Orthopnea, Paroxysmal Nocturnal Dyspnea  Respiratory:  Cough, Wheezing, Shortness of breath, Chest tightness, Apnea  Gastrointestinal:  Nausea, Vomiting, Diarrhea, Constipation, Heartburn, Blood in stool  Genitourinary:  Difficulty or painful urination, Flank pain, Change in frequency, Urgency  Skin:  Color change, Rash, Itching, Wound  Psychiatric:  Hallucinations, Anxiety, Depression, Suicidal ideation  Hematological:  Enlarged glands, Easy bleeding, Easily bruising  Musculoskeletal:  Joint pain, Back pain, Gait problems, Joint swelling, Myalgias  Neurological:  Dizziness, Headaches, Presyncope, Numbness, Seizures, Tremors  Allergy:  Environmental allergies, Food allergies  Endocrine:  Heat Intolerance, Cold Intolerance, Polydipsia, Polyphagia, Polyuria      PHYSICAL EXAM:  Vitals:    02/02/21 0919   BP: 114/76   Pulse: 103   Resp: 14   Temp: 98.7 °F (37.1 °C)   TempSrc: Oral   SpO2: 99%   Weight: 172 lb (78 kg)   Height: 5' 5\" (1.651 m)     Body mass index is 28.62 kg/m². VS Reviewed  General Appearance: A&O x 3, No acute distress,well developed and well- nourished  Head: normocephalic and atraumatic  Eyes: pupils equal, round, and reactive to light, extraocular eye movements intact, conjunctivae and eye lids without erythema  Neck: supple and non-tender without mass, no thyromegaly or thyroid nodules, no cervical lymphadenopathy  Pulmonary/Chest: clear to auscultation bilaterally- no wheezes, rales or rhonchi, normal air movement, no respiratory distress or retractions  Cardiovascular: S1 and S2 auscultated w/ RRR. No murmurs, rubs, clicks, or gallops, distal pulses intact. Abdomen: soft, non-tender, non-distended, bowl sounds physiologic,  no rebound or guarding, no masses or hernias noted. Liver and spleen without enlargement. Extremities: no cyanosis, clubbing or edema of the lower extremities  Musculoskeletal: No joint swelling or gross deformity   Neuro:  Alert, 5/5 strength globally and symmetrically  Psych: Affect and mood are flat. Thought process is normal without evidence of psychosis. Good insight and appropriate interaction. Cognition and memory appear to be intact. Skin: warm and dry, no rash or erythema  Lymph:  No cervical, auricular or supraclavicular lymph nodes palpated    ASSESSMENT & PLAN  Naveed Franco was seen today for depression. Diagnoses and all orders for this visit:    Generalized anxiety disorder    Mood disorder (HCC)  -     ARIPiprazole (ABILIFY) 2 MG tablet;  Take 1 tablet by mouth daily      - start Abilify for mood stabilization  - once moods stabilized, will add SSRI or SNRI for anxiety  - contracted for safety today, suicidal precautions given    DISPOSITION    Return in about 4 weeks (around 3/2/2021) for Mood disorder. Susan released without restrictions. PATIENT COUNSELING    Counseling was provided today regarding the following topics: Healthy eating habits, Regular exercise, substance abuse and healthy sleep habits. Susan received counseling on the following healthy behaviors: nutrition    Patient given educational materials on: See Attached    I have instructed Susan to complete a self tracking handout on none and instructed them to bring it with them to her next appointment. Barriers to learning and self management: none    Discussed use, benefit, and side effects of prescribed medications. Barriers to medication compliance addressed. All patient questions answered. Pt voiced understanding.        Electronically signed by ALBERTA Gonsales CNP on 2/2/2021 at 9:59 AM

## 2021-02-02 NOTE — ED NOTES
Pt to ED via EMS from [facility] in [place] for hand injury in which pt right hand became entrapped in conveyor belt. Pt ring finger, middle finger and pointer finger all swollen at this time with bruising noted. Middle finger has small break in skin with no bleeding at this time. Pt rates pain 10/10 at this time. Pt states machine stopped when hand became entrapped in conveyor. Pt friend at bedside at this time. No distress noted at this time.        [facility]  02/02/21 4678

## 2021-02-03 NOTE — ED NOTES
Jadon PARK at bedside for workman's comp workup.        [facility]  02/02/21 1919

## 2021-04-05 ENCOUNTER — OFFICE VISIT (OUTPATIENT)
Dept: FAMILY MEDICINE CLINIC | Age: 21
End: 2021-04-05
Payer: COMMERCIAL

## 2021-04-05 VITALS
BODY MASS INDEX: 29.12 KG/M2 | DIASTOLIC BLOOD PRESSURE: 78 MMHG | HEIGHT: 65 IN | HEART RATE: 106 BPM | RESPIRATION RATE: 14 BRPM | SYSTOLIC BLOOD PRESSURE: 120 MMHG | WEIGHT: 174.8 LBS | OXYGEN SATURATION: 98 % | TEMPERATURE: 98.6 F

## 2021-04-05 DIAGNOSIS — F39 MOOD DISORDER (HCC): Primary | ICD-10-CM

## 2021-04-05 DIAGNOSIS — F41.1 GENERALIZED ANXIETY DISORDER: ICD-10-CM

## 2021-04-05 PROCEDURE — 99213 OFFICE O/P EST LOW 20 MIN: CPT | Performed by: NURSE PRACTITIONER

## 2021-04-05 RX ORDER — FLUOXETINE HYDROCHLORIDE 20 MG/1
20 CAPSULE ORAL DAILY
Qty: 30 CAPSULE | Refills: 3 | Status: SHIPPED | OUTPATIENT
Start: 2021-04-05 | End: 2021-05-17 | Stop reason: SDUPTHER

## 2021-04-05 RX ORDER — ARIPIPRAZOLE 5 MG/1
5 TABLET ORAL DAILY
Qty: 30 TABLET | Refills: 3 | Status: SHIPPED | OUTPATIENT
Start: 2021-04-05 | End: 2021-05-17 | Stop reason: SDUPTHER

## 2021-04-05 NOTE — PROGRESS NOTES
 Sexual activity: Yes     Partners: Male     Comment: uses condoms   Lifestyle    Physical activity     Days per week: Not on file     Minutes per session: Not on file    Stress: Not on file   Relationships    Social connections     Talks on phone: Not on file     Gets together: Not on file     Attends [employer] service: Not on file     Active member of club or organization: Not on file     Attends meetings of clubs or organizations: Not on file     Relationship status: Not on file    Intimate partner violence     Fear of current or ex partner: Not on file     Emotionally abused: Not on file     Physically abused: Not on file     Forced sexual activity: Not on file   Other Topics Concern    Not on file   Social History Narrative    Not on file       Family History   Problem Relation Age of Onset    Depression Mother     No Known Problems Father     Allergy (Severe) Maternal Grandmother     Other Maternal Grandmother         fibromyalgia    Depression Maternal Grandmother     Cancer Paternal Grandmother         Pancreatic     Colon Cancer Neg Hx     Colon Polyps Neg Hx          I have reviewed the patient's past medical history, past surgical history, allergies, medications, social and family history and I have made updates where appropriate.       Review of Systems  Positive responses are highlighted in bold    Constitutional:  Fever, Chills, Night Sweats, Fatigue, Unexpected changes in weight  Eyes:  Eye discharge, Eye pain, Eye redness, Visual disturbances   HENT:  Ear pain, Tinnitus, Nosebleeds, Trouble swallowing, Hearing loss, Sore throat  Cardiovascular:  Chest Pain, Palpitations, Orthopnea, Paroxysmal Nocturnal Dyspnea  Respiratory:  Cough, Wheezing, Shortness of breath, Chest tightness, Apnea  Gastrointestinal:  Nausea, Vomiting, Diarrhea, Constipation, Heartburn, Blood in stool  Genitourinary:  Difficulty or painful urination, Flank pain, Change in frequency, Urgency  Skin:  Color change, Rash, Itching, Wound  Psychiatric:  Hallucinations, Anxiety, Depression, Suicidal ideation  Hematological:  Enlarged glands, Easy bleeding, Easily bruising  Musculoskeletal:  Joint pain, Back pain, Gait problems, Joint swelling, Myalgias  Neurological:  Dizziness, Headaches, Presyncope, Numbness, Seizures, Tremors  Allergy:  Environmental allergies, Food allergies  Endocrine:  Heat Intolerance, Cold Intolerance, Polydipsia, Polyphagia, Polyuria      PHYSICAL EXAM:  Vitals:    04/05/21 1056 04/05/21 1106   BP: (!) 144/86 120/78   Pulse: 106    Resp: 14    Temp: 98.6 °F (37 °C)    SpO2: 98%    Weight: 174 lb 12.8 oz (79.3 kg)    Height: 5' 5\" (1.651 m)      Body mass index is 29.09 kg/m². VS Reviewed  General Appearance: A&O x 3, No acute distress,well developed and well- nourished  Head: normocephalic and atraumatic  Eyes: pupils equal, round, and reactive to light, extraocular eye movements intact, conjunctivae and eye lids without erythema  Neck: supple and non-tender without mass, no thyromegaly or thyroid nodules, no cervical lymphadenopathy  Pulmonary/Chest: clear to auscultation bilaterally- no wheezes, rales or rhonchi, normal air movement, no respiratory distress or retractions  Cardiovascular: S1 and S2 auscultated w/ RRR. No murmurs, rubs, clicks, or gallops, distal pulses intact. Abdomen: soft, non-tender, non-distended, bowl sounds physiologic,  no rebound or guarding, no masses or hernias noted. Liver and spleen without enlargement. Extremities: no cyanosis, clubbing or edema of the lower extremities  Musculoskeletal: No joint swelling or gross deformity   Neuro:  Alert, 5/5 strength globally and symmetrically  Psych: Affect and mood are normal. Thought process is normal without evidence of psychosis. Good insight and appropriate interaction. Cognition and memory appear to be intact.   Skin: warm and dry, no rash or erythema  Lymph:  No cervical, auricular or supraclavicular lymph nodes palpated    ASSESSMENT & PLAN  Roselia Cyr was seen today for follow-up. Diagnoses and all orders for this visit:    Mood disorder ([facility] [address]  -     ARIPiprazole (ABILIFY) 5 MG tablet; Take 1 tablet by mouth daily  -     FLUoxetine (PROZAC) 20 MG capsule; Take 1 capsule by mouth daily    Generalized anxiety disorder  -     FLUoxetine (PROZAC) 20 MG capsule; Take 1 capsule by mouth daily      - increase dose of Abilify to 5 mg  - add Prozac 20 mg which she has been on in the past and done quite well with  - suicidal precautions given    DISPOSITION    Return in about 6 weeks (around 5/17/2021) for mood disorder, anxiety. Susan released without restrictions. PATIENT COUNSELING    Counseling was provided today regarding the following topics: Healthy eating habits, Regular exercise, substance abuse and healthy sleep habits. Susan received counseling on the following healthy behaviors: nutrition    Patient given educational materials on: See Attached    I have instructed Susan to complete a self tracking handout on none and instructed them to bring it with them to her next appointment. Barriers to learning and self management: none    Discussed use, benefit, and side effects of prescribed medications. Barriers to medication compliance addressed. All patient questions answered. Pt voiced understanding.        Electronically signed by ALBERTA Auguste CNP on 4/5/2021 at 11:08 AM

## 2021-05-17 ENCOUNTER — OFFICE VISIT (OUTPATIENT)
Dept: FAMILY MEDICINE CLINIC | Age: 21
End: 2021-05-17
Payer: COMMERCIAL

## 2021-05-17 VITALS
DIASTOLIC BLOOD PRESSURE: 76 MMHG | RESPIRATION RATE: 14 BRPM | OXYGEN SATURATION: 99 % | BODY MASS INDEX: 28.89 KG/M2 | TEMPERATURE: 99.1 F | HEART RATE: 95 BPM | HEIGHT: 65 IN | WEIGHT: 173.4 LBS | SYSTOLIC BLOOD PRESSURE: 128 MMHG

## 2021-05-17 DIAGNOSIS — F39 MOOD DISORDER (HCC): ICD-10-CM

## 2021-05-17 DIAGNOSIS — F41.1 GENERALIZED ANXIETY DISORDER: ICD-10-CM

## 2021-05-17 DIAGNOSIS — F33.2 SEVERE EPISODE OF RECURRENT MAJOR DEPRESSIVE DISORDER, WITHOUT PSYCHOTIC FEATURES (HCC): Primary | ICD-10-CM

## 2021-05-17 PROCEDURE — 99213 OFFICE O/P EST LOW 20 MIN: CPT | Performed by: NURSE PRACTITIONER

## 2021-05-17 RX ORDER — FLUOXETINE HYDROCHLORIDE 40 MG/1
40 CAPSULE ORAL DAILY
Qty: 30 CAPSULE | Refills: 3 | Status: SHIPPED | OUTPATIENT
Start: 2021-05-17 | End: 2021-06-15 | Stop reason: SDUPTHER

## 2021-05-17 RX ORDER — ARIPIPRAZOLE 5 MG/1
5 TABLET ORAL DAILY
Qty: 30 TABLET | Refills: 3 | Status: SHIPPED | OUTPATIENT
Start: 2021-05-17 | End: 2021-06-15 | Stop reason: SDUPTHER

## 2021-05-17 ASSESSMENT — SOCIAL DETERMINANTS OF HEALTH (SDOH): HOW HARD IS IT FOR YOU TO PAY FOR THE VERY BASICS LIKE FOOD, HOUSING, MEDICAL CARE, AND HEATING?: SOMEWHAT HARD

## 2021-05-17 NOTE — PROGRESS NOTES
Chief Complaint   Patient presents with    Follow-up     mood disorder and anxiety, pt states she is not doing the best     Other     talk about seeing a psychiatrist       History obtained from chart review and the patient. SUBJECTIVE:  Gianna Wakefield is a 21 y.o. female that presents today for anxiety and depression    Anxiety/MDD  She was off the Abilify for several weeks. She did note improvement with the mood swings when she was on it because after she quit taking it they got a lot worse. She was at least able to hide or manage them a little better while taking the Abilify. She also quit taking the Prozac. Feeling depressed daily. She does deal with suicidal ideations frequently, but denies any intent or desire to do that, but admits to a lot of urges and thoughts to cut herself which she has done in the past. She really didn't note much improvement while she was taking the Prozac 20 mg.     Age/Gender Health Maintenance    Lipid - 35  DM Screen - 35  Colon Cancer Screening - 48  Lung Cancer Screening (Age 54 to [de-identified] with 30 pack year hx, current smoker or quit within past 15 years) - n/a    Tetanus - needs  Influenza Vaccine - 10/18  Pneumonia Vaccine - 65  Zostavax - 50   HPV Vaccine - needs    Breast Cancer Screening - 50  Cervical Cancer Screening - 21  Osteoporosis Screening - 65  Chlamydia Screen - 16    PSA testing discussion - n/a  AAA Screening - n/a    Falls screening - n/a    Current Outpatient Medications   Medication Sig Dispense Refill    ARIPiprazole (ABILIFY) 5 MG tablet Take 1 tablet by mouth daily 30 tablet 3    FLUoxetine (PROZAC) 40 MG capsule Take 1 capsule by mouth daily 30 capsule 3    NONFORMULARY Birth control      ibuprofen (ADVIL;MOTRIN) 200 MG tablet Take 200 mg by mouth every 6 hours as needed for Pain      Naproxen Sodium (ALEVE PO) Take by mouth as needed      ibuprofen (IBU) 600 MG tablet Take 1 tablet by mouth every 6 hours as needed for Pain 40 tablet 0    clonazePAM (KLONOPIN) 0.5 MG tablet Take 1 tablet by mouth nightly as needed (panic attack and/or sleep) for up to 14 days. 14 tablet 0    naproxen (NAPROSYN) 500 MG tablet Take 1 tablet by mouth 2 times daily (with meals) for 24 doses 24 tablet 0     No current facility-administered medications for this visit. Orders Placed This Encounter   Medications    ARIPiprazole (ABILIFY) 5 MG tablet     Sig: Take 1 tablet by mouth daily     Dispense:  30 tablet     Refill:  3    FLUoxetine (PROZAC) 40 MG capsule     Sig: Take 1 capsule by mouth daily     Dispense:  30 capsule     Refill:  3         All medications reviewed and reconciled, including OTC and herbal medications. Updated list given to patient. Patient Active Problem List   Diagnosis    Major depressive disorder, recurrent episode, moderate (HCC)    Attention deficit hyperactivity disorder (ADHD), predominantly inattentive type    Generalized anxiety disorder    Mood disorder ([facility] [address]       Past Medical History:   Diagnosis Date    ADHD (attention deficit hyperactivity disorder)     Anxiety     Attention deficit hyperactivity disorder (ADHD), predominantly inattentive type 1/16/2018    Blood in stool     Convergent comitant strabismus 2014    Diarrhea     PCOS (polycystic ovarian syndrome)        No past surgical history on file.     No Known Allergies    Social History     Socioeconomic History    Marital status: Single     Spouse name: Not on file    Number of children: Not on file    Years of education: Not on file    Highest education level: Not on file   Occupational History    Not on file   Tobacco Use    Smoking status: Never Smoker    Smokeless tobacco: Never Used   Vaping Use    Vaping Use: Never used   Substance and Sexual Activity    Alcohol use: No    Drug use: No    Sexual activity: Yes     Partners: Male     Comment: uses condoms   Other Topics Concern    Not on file   Social History Narrative    Not on file Social Determinants of Health     Financial Resource Strain: Medium Risk    Difficulty of Paying Living Expenses: Somewhat hard   Food Insecurity: Food Insecurity Present    Worried About Running Out of Food in the Last Year: Sometimes true    Diana of Food in the Last Year: Sometimes true   Transportation Needs:     Lack of Transportation (Medical):  Lack of Transportation (Non-Medical):    Physical Activity:     Days of Exercise per Week:     Minutes of Exercise per Session:    Stress:     Feeling of Stress :    Social Connections:     Frequency of Communication with Friends and Family:     Frequency of Social Gatherings with Friends and Family:     Attends [employer] Services:     Active Member of Clubs or Organizations:     Attends Club or Organization Meetings:     Marital Status:    Intimate Partner Violence:     Fear of Current or Ex-Partner:     Emotionally Abused:     Physically Abused:     Sexually Abused:        Family History   Problem Relation Age of Onset    Depression Mother     No Known Problems Father     Allergy (Severe) Maternal Grandmother     Other Maternal Grandmother         fibromyalgia    Depression Maternal Grandmother     Cancer Paternal Grandmother         Pancreatic     Colon Cancer Neg Hx     Colon Polyps Neg Hx          I have reviewed the patient's past medical history, past surgical history, allergies, medications, social and family history and I have made updates where appropriate.       Review of Systems  Positive responses are highlighted in bold    Constitutional:  Fever, Chills, Night Sweats, Fatigue, Unexpected changes in weight  Eyes:  Eye discharge, Eye pain, Eye redness, Visual disturbances   HENT:  Ear pain, Tinnitus, Nosebleeds, Trouble swallowing, Hearing loss, Sore throat  Cardiovascular:  Chest Pain, Palpitations, Orthopnea, Paroxysmal Nocturnal Dyspnea  Respiratory:  Cough, Wheezing, Shortness of breath, Chest tightness, Apnea  Gastrointestinal:  Nausea, Vomiting, Diarrhea, Constipation, Heartburn, Blood in stool  Genitourinary:  Difficulty or painful urination, Flank pain, Change in frequency, Urgency  Skin:  Color change, Rash, Itching, Wound  Psychiatric:  Hallucinations, Anxiety, Depression, Suicidal ideation  Hematological:  Enlarged glands, Easy bleeding, Easily bruising  Musculoskeletal:  Joint pain, Back pain, Gait problems, Joint swelling, Myalgias  Neurological:  Dizziness, Headaches, Presyncope, Numbness, Seizures, Tremors  Allergy:  Environmental allergies, Food allergies  Endocrine:  Heat Intolerance, Cold Intolerance, Polydipsia, Polyphagia, Polyuria      PHYSICAL EXAM:  Vitals:    05/17/21 1049   BP: 128/76   Pulse: 95   Resp: 14   Temp: 99.1 °F (37.3 °C)   TempSrc: Oral   SpO2: 99%   Weight: 173 lb 6.4 oz (78.7 kg)   Height: 5' 5\" (1.651 m)     Body mass index is 28.86 kg/m². VS Reviewed  General Appearance: A&O x 3, No acute distress,well developed and well- nourished  Head: normocephalic and atraumatic  Eyes: pupils equal, round, and reactive to light, extraocular eye movements intact, conjunctivae and eye lids without erythema  Neck: supple and non-tender without mass, no thyromegaly or thyroid nodules, no cervical lymphadenopathy  Pulmonary/Chest: clear to auscultation bilaterally- no wheezes, rales or rhonchi, normal air movement, no respiratory distress or retractions  Cardiovascular: S1 and S2 auscultated w/ RRR. No murmurs, rubs, clicks, or gallops, distal pulses intact. Abdomen: soft, non-tender, non-distended, bowl sounds physiologic,  no rebound or guarding, no masses or hernias noted. Liver and spleen without enlargement. Extremities: no cyanosis, clubbing or edema of the lower extremities  Musculoskeletal: No joint swelling or gross deformity   Neuro:  Alert, 5/5 strength globally and symmetrically  Psych: Affect and mood are flat and depressed.  Thought process is normal without evidence of psychosis. Good insight and appropriate interaction. Cognition and memory appear to be intact. Skin: warm and dry, no rash or erythema  Lymph:  No cervical, auricular or supraclavicular lymph nodes palpated    ASSESSMENT & PLAN  Shelly Gardner was seen today for follow-up and other. Diagnoses and all orders for this visit:    Severe episode of recurrent major depressive disorder, without psychotic features (HCC)  -     ARIPiprazole (ABILIFY) 5 MG tablet; Take 1 tablet by mouth daily  -     FLUoxetine (PROZAC) 40 MG capsule; Take 1 capsule by mouth daily    Mood disorder (HCC)  -     ARIPiprazole (ABILIFY) 5 MG tablet; Take 1 tablet by mouth daily  -     FLUoxetine (PROZAC) 40 MG capsule; Take 1 capsule by mouth daily    Generalized anxiety disorder  -     FLUoxetine (PROZAC) 40 MG capsule; Take 1 capsule by mouth daily      - resume Abilify and increase Prozac  - compliance has been poor  - referral (information) given to f/u at [facility]  - suicidal precaution given, patient contracted for safety    DISPOSITION    Return in about 4 weeks (around 6/14/2021) for MDD. Susan released without restrictions. PATIENT COUNSELING    Counseling was provided today regarding the following topics: Healthy eating habits, Regular exercise, substance abuse and healthy sleep habits. Susan received counseling on the following healthy behaviors: nutrition    Patient given educational materials on: See Attached    I have instructed Susan to complete a self tracking handout on none and instructed them to bring it with them to her next appointment. Barriers to learning and self management: none    Discussed use, benefit, and side effects of prescribed medications. Barriers to medication compliance addressed. All patient questions answered. Pt voiced understanding.        Electronically signed by Gabriel Collet, APRN - CNP on 5/17/2021 at 11:03 AM

## 2021-05-17 NOTE — PATIENT INSTRUCTIONS
[facility] Professional Service  [phone]    [address] [facility] [address], [address]

## 2021-06-15 ENCOUNTER — VIRTUAL VISIT (OUTPATIENT)
Dept: FAMILY MEDICINE CLINIC | Age: 21
End: 2021-06-15
Payer: COMMERCIAL

## 2021-06-15 DIAGNOSIS — F39 MOOD DISORDER (HCC): ICD-10-CM

## 2021-06-15 DIAGNOSIS — F41.1 GENERALIZED ANXIETY DISORDER: ICD-10-CM

## 2021-06-15 DIAGNOSIS — F33.1 MODERATE EPISODE OF RECURRENT MAJOR DEPRESSIVE DISORDER (HCC): ICD-10-CM

## 2021-06-15 PROCEDURE — 99213 OFFICE O/P EST LOW 20 MIN: CPT | Performed by: NURSE PRACTITIONER

## 2021-06-15 RX ORDER — FLUOXETINE HYDROCHLORIDE 40 MG/1
40 CAPSULE ORAL DAILY
Qty: 30 CAPSULE | Refills: 3 | Status: SHIPPED | OUTPATIENT
Start: 2021-06-15 | End: 2021-12-16 | Stop reason: ALTCHOICE

## 2021-06-15 RX ORDER — ARIPIPRAZOLE 5 MG/1
5 TABLET ORAL DAILY
Qty: 30 TABLET | Refills: 3 | Status: SHIPPED | OUTPATIENT
Start: 2021-06-15 | End: 2021-12-16 | Stop reason: ALTCHOICE

## 2021-06-15 ASSESSMENT — ENCOUNTER SYMPTOMS
EYE REDNESS: 0
DIARRHEA: 0
RHINORRHEA: 0
WHEEZING: 0
COLOR CHANGE: 0
COUGH: 0
ABDOMINAL PAIN: 0
TROUBLE SWALLOWING: 0
NAUSEA: 0
EYE PAIN: 0
SHORTNESS OF BREATH: 0
SORE THROAT: 0
VOMITING: 0

## 2021-06-15 NOTE — PROGRESS NOTES
6/15/2021    TELEHEALTH EVALUATION -- Audio/Visual (During RCXRE-76 public health emergency)    HPI:    Seth Ariza (:  2000) has requested an audio/video evaluation for the following concern(s):    MDD  She wasn't able to afford the 40 mg Prozac and 5 mg Abilify, but she has been taking 20 mg Prozac and 2 mg Abilify. She is still having some mood swings, but they are better. She does note that when she gets a mood swing of anger it is pretty extreme. She does feel like the depression is better. She is feeling depressed every other day. Energy/motivation are still low. Suicidal thoughts are improving, not having as many. Appetite is unchanged, sleeping ok depending upon the day and her routine/schedule. She will be able to afford both meds next week. Anxiety is still pretty high. She has a roommate now and is anxious around her roommate, afraid she's making her upset. Other triggers include generalized things. No panic attacks. Review of Systems   Constitutional: Positive for fatigue. Negative for chills and diaphoresis. HENT: Negative for congestion, rhinorrhea, sore throat and trouble swallowing. Eyes: Negative for pain, redness and visual disturbance. Respiratory: Negative for cough, shortness of breath and wheezing. Cardiovascular: Negative for chest pain, palpitations and leg swelling. Gastrointestinal: Negative for abdominal pain, diarrhea, nausea and vomiting. Endocrine: Negative for polydipsia, polyphagia and polyuria. Genitourinary: Negative for decreased urine volume, dysuria, frequency and urgency. Musculoskeletal: Negative for arthralgias and myalgias. Skin: Negative for color change and rash. Allergic/Immunologic: Negative for environmental allergies, food allergies and immunocompromised state. Neurological: Negative for dizziness, tremors, syncope and headaches. Hematological: Negative. Negative for adenopathy.    Psychiatric/Behavioral: Positive for suicidal ideas. Negative for behavioral problems, confusion, decreased concentration, self-injury and sleep disturbance. The patient is nervous/anxious. All other systems reviewed and are negative. Prior to Visit Medications    Medication Sig Taking? Authorizing Provider   ARIPiprazole (ABILIFY) 5 MG tablet Take 1 tablet by mouth daily Yes Angela Sanam APRN - CNP   FLUoxetine (PROZAC) 40 MG capsule Take 1 capsule by mouth daily Yes Angela Minimaria elena, APRN - CNP   NONFORMULARY Birth control  Historical Provider, MD   ibuprofen (IBU) 600 MG tablet Take 1 tablet by mouth every 6 hours as needed for Pain  Feliberto Patton MD   clonazePAM (KLONOPIN) 0.5 MG tablet Take 1 tablet by mouth nightly as needed (panic attack and/or sleep) for up to 14 days.   Angela Minium, APRN - CNP   ibuprofen (ADVIL;MOTRIN) 200 MG tablet Take 200 mg by mouth every 6 hours as needed for Pain  Historical Provider, MD   Naproxen Sodium (ALEVE PO) Take by mouth as needed  Historical Provider, MD   naproxen (NAPROSYN) 500 MG tablet Take 1 tablet by mouth 2 times daily (with meals) for 24 doses  Nathan Trujillo MD   escitalopram (LEXAPRO) 10 MG tablet Take 1 tablet by mouth daily  Angela Minium, APRN - CNP   SUMAtriptan (IMITREX) 50 MG tablet Take 1 tablet by mouth once as needed for Migraine  Angela Minium, APRN - CNP   propranolol (INDERAL LA) 60 MG extended release capsule Take 1 capsule by mouth daily  Angela Minium, APRN - CNP   amitriptyline (ELAVIL) 10 MG tablet Take 1 tablet by mouth nightly  Angela Minium, APRN - CNP       Social History     Tobacco Use    Smoking status: Never Smoker    Smokeless tobacco: Never Used   Vaping Use    Vaping Use: Never used   Substance Use Topics    Alcohol use: No    Drug use: No        No Known Allergies,   Past Medical History:   Diagnosis Date    ADHD (attention deficit hyperactivity disorder)     Anxiety     Attention deficit hyperactivity disorder (ADHD), predominantly inattentive type 1/16/2018    Blood in stool     Convergent comitant strabismus 2014    Diarrhea     PCOS (polycystic ovarian syndrome)    , No past surgical history on file.,   Family History   Problem Relation Age of Onset    Depression Mother     No Known Problems Father     Allergy (Severe) Maternal Grandmother     Other Maternal Grandmother         fibromyalgia    Depression Maternal Grandmother     Cancer Paternal Grandmother         Pancreatic     Colon Cancer Neg Hx     Colon Polyps Neg Hx    ,   Immunization History   Administered Date(s) Administered    Td (Adult), 5 Lf Tetanus Toxoid, Pf (Tenivac, Decavac) 02/02/2021   ,   Health Maintenance   Topic Date Due    Hepatitis C screen  Never done    Varicella vaccine (1 of 2 - 2-dose childhood series) Never done    HPV vaccine (1 - 2-dose series) Never done    COVID-19 Vaccine (1) Never done    HIV screen  Never done    Chlamydia screen  Never done    DTaP/Tdap/Td vaccine (1 - Tdap) 10/12/2019    A1C test (Diabetic or Prediabetic)  05/15/2021    Flu vaccine (Season Ended) 09/01/2021    Meningococcal (ACWY) vaccine  Completed    Hepatitis A vaccine  Aged Out    Hepatitis B vaccine  Aged Out    Hib vaccine  Aged Out    Pneumococcal 0-64 years Vaccine  Aged Out       PHYSICAL EXAMINATION:  [ INSTRUCTIONS:  \"[x]\" Indicates a positive item  \"[]\" Indicates a negative item  -- DELETE ALL ITEMS NOT EXAMINED]  Vital Signs: (As obtained by patient/caregiver or practitioner observation)    Blood pressure-  Heart rate-    Respiratory rate-    Temperature-  Pulse oximetry-     Constitutional: [x] Appears well-developed and well-nourished [x] No apparent distress      [] Abnormal-   Mental status  [x] Alert and awake  [x] Oriented to person/place/time [x]Able to follow commands      Eyes:  EOM    [x]  Normal  [] Abnormal-  Sclera  [x]  Normal  [] Abnormal -         Discharge [x]  None visible  [] Abnormal -    HENT:   [x] Normocephalic, atraumatic. [] Abnormal   [x] Mouth/Throat: Mucous membranes are moist.     External Ears [x] Normal  [] Abnormal-     Neck: [x] No visualized mass     Pulmonary/Chest: [x] Respiratory effort normal.  [x] No visualized signs of difficulty breathing or respiratory distress        [] Abnormal-      Musculoskeletal:   [x] Normal gait with no signs of ataxia         [x] Normal range of motion of neck        [] Abnormal-       Neurological:        [x] No Facial Asymmetry (Cranial nerve 7 motor function) (limited exam to video visit)          [x] No gaze palsy        [] Abnormal-         Skin:        [x] No significant exanthematous lesions or discoloration noted on facial skin         [] Abnormal-            Psychiatric:       [x] Normal Affect [x] No Hallucinations        [] Abnormal-     Other pertinent observable physical exam findings-     ASSESSMENT & PLAN  Diagnoses and all orders for this visit:    Moderate episode of recurrent major depressive disorder (HCC)  -     ARIPiprazole (ABILIFY) 5 MG tablet; Take 1 tablet by mouth daily  -     FLUoxetine (PROZAC) 40 MG capsule; Take 1 capsule by mouth daily    Mood disorder (HCC)  -     ARIPiprazole (ABILIFY) 5 MG tablet; Take 1 tablet by mouth daily  -     FLUoxetine (PROZAC) 40 MG capsule; Take 1 capsule by mouth daily    Generalized anxiety disorder  -     FLUoxetine (PROZAC) 40 MG capsule; Take 1 capsule by mouth daily      - anxiety/depression and mood swings improving now that she is consistently taking 20 mg Prozac and 2 mg Abilify  - ok for her to double up on the 20 mg Prozac and 4 mg Abilify until she can afford the new Rx  - suicidal precautions given    Return in about 6 weeks (around 7/27/2021) for anxiety and depression. Karie Jeff is a 21 y.o. female being evaluated by a Virtual Visit (video visit) encounter to address concerns as mentioned above. A caregiver was present when appropriate.  Due to this being a TeleHealth encounter (During COVID-19 public health emergency), evaluation of the following organ systems was limited: Vitals/Constitutional/EENT/Resp/CV/GI//MS/Neuro/Skin/Heme-Lymph-Imm. Pursuant to the emergency declaration under the [address] [place] [address], [address] [place] [address] authority and the [employer] Act, this Virtual Visit was conducted with patient's (and/or legal guardian's) consent, to reduce the patient's risk of exposure to COVID-19 and provide necessary medical care. The patient (and/or legal guardian) has also been advised to contact this office for worsening conditions or problems, and seek emergency medical treatment and/or call 911 if deemed necessary. Services were provided through a video synchronous discussion virtually to substitute for in-person clinic visit. Patient and provider were located at their individual homes. --ALBERTA Lee CNP on 6/15/2021 at 11:13 AM    An electronic signature was used to authenticate this note.

## 2021-11-16 ENCOUNTER — OFFICE VISIT (OUTPATIENT)
Dept: FAMILY MEDICINE CLINIC | Age: 21
End: 2021-11-16
Payer: COMMERCIAL

## 2021-11-16 VITALS
RESPIRATION RATE: 14 BRPM | HEART RATE: 93 BPM | BODY MASS INDEX: 30.69 KG/M2 | DIASTOLIC BLOOD PRESSURE: 78 MMHG | TEMPERATURE: 99.2 F | SYSTOLIC BLOOD PRESSURE: 122 MMHG | WEIGHT: 184.2 LBS | HEIGHT: 65 IN

## 2021-11-16 DIAGNOSIS — F33.2 SEVERE EPISODE OF RECURRENT MAJOR DEPRESSIVE DISORDER, WITHOUT PSYCHOTIC FEATURES (HCC): ICD-10-CM

## 2021-11-16 DIAGNOSIS — F41.1 GENERALIZED ANXIETY DISORDER: Primary | ICD-10-CM

## 2021-11-16 PROCEDURE — 99213 OFFICE O/P EST LOW 20 MIN: CPT | Performed by: NURSE PRACTITIONER

## 2021-11-16 RX ORDER — BUPROPION HYDROCHLORIDE 150 MG/1
150 TABLET ORAL EVERY MORNING
Qty: 30 TABLET | Refills: 3 | Status: SHIPPED | OUTPATIENT
Start: 2021-11-16 | End: 2022-03-17 | Stop reason: ALTCHOICE

## 2021-11-16 NOTE — PROGRESS NOTES
Chief Complaint   Patient presents with    Depression    Anxiety       History obtained from chart review and the patient. SUBJECTIVE:  Marcus Copeland is a 24 y.o. female that presents today for anxiety and depression    Still going to school at [employer], but plans to transfer to [employer] and do social work or human services. Working at [employer]. Has boyfriend who is good support. Family lives out of town. Anxiety/MDD  Hasn't been taking meds since end of July. She felt like the medication made her feel sick, dizzy, lightheaded, nauseated. She didn't go to [facility] because of a lot of anxiety. She struggles making the actual appt to go to [facility]. Feeling depressed daily. She still gets intrusive suicidal thoughts, but denies any plan. Has intrusive thoughts probably 3 times/week. She is struggling falling asleep, has difficulties waking up then. Appetite is good. Motivation and energy are low. She still gets mood swings. Anxiety is still elevated, having panic attacks about once a week. Triggers are general things, and also social interactions.     Age/Gender Health Maintenance    Lipid - 35  DM Screen - 35  Colon Cancer Screening - 48  Lung Cancer Screening (Age 54 to [de-identified] with 30 pack year hx, current smoker or quit within past 15 years) - n/a    Tetanus - needs  Influenza Vaccine - 10/18  Pneumonia Vaccine - 65  Zostavax - 50   HPV Vaccine - needs    Breast Cancer Screening - 50  Cervical Cancer Screening - 21  Osteoporosis Screening - 65  Chlamydia Screen - 16    PSA testing discussion - n/a  AAA Screening - n/a    Falls screening - n/a    Current Outpatient Medications   Medication Sig Dispense Refill    buPROPion (WELLBUTRIN XL) 150 MG extended release tablet Take 1 tablet by mouth every morning 30 tablet 3    sertraline (ZOLOFT) 50 MG tablet Take 1 tablet by mouth daily 30 tablet 5    ibuprofen (ADVIL;MOTRIN) 200 MG tablet Take 200 mg by mouth every 6 hours as needed for Pain      Naproxen Sodium (ALEVE PO) Take by mouth as needed      ARIPiprazole (ABILIFY) 5 MG tablet Take 1 tablet by mouth daily (Patient not taking: Reported on 11/16/2021) 30 tablet 3    FLUoxetine (PROZAC) 40 MG capsule Take 1 capsule by mouth daily (Patient not taking: Reported on 11/16/2021) 30 capsule 3    NONFORMULARY Birth control (Patient not taking: Reported on 11/16/2021)      ibuprofen (IBU) 600 MG tablet Take 1 tablet by mouth every 6 hours as needed for Pain 40 tablet 0    clonazePAM (KLONOPIN) 0.5 MG tablet Take 1 tablet by mouth nightly as needed (panic attack and/or sleep) for up to 14 days. 14 tablet 0    naproxen (NAPROSYN) 500 MG tablet Take 1 tablet by mouth 2 times daily (with meals) for 24 doses 24 tablet 0     No current facility-administered medications for this visit. Orders Placed This Encounter   Medications    buPROPion (WELLBUTRIN XL) 150 MG extended release tablet     Sig: Take 1 tablet by mouth every morning     Dispense:  30 tablet     Refill:  3    sertraline (ZOLOFT) 50 MG tablet     Sig: Take 1 tablet by mouth daily     Dispense:  30 tablet     Refill:  5         All medications reviewed and reconciled, including OTC and herbal medications. Updated list given to patient. Patient Active Problem List   Diagnosis    Major depressive disorder, recurrent episode, moderate (HCC)    Attention deficit hyperactivity disorder (ADHD), predominantly inattentive type    Generalized anxiety disorder    Mood disorder ([facility] [address]       Past Medical History:   Diagnosis Date    ADHD (attention deficit hyperactivity disorder)     Anxiety     Attention deficit hyperactivity disorder (ADHD), predominantly inattentive type 1/16/2018    Blood in stool     Convergent comitant strabismus 2014    Diarrhea     PCOS (polycystic ovarian syndrome)        History reviewed. No pertinent surgical history.     No Known Allergies    Social History     Socioeconomic History    Marital status: Single     Spouse name: Not on file    Number of children: Not on file    Years of education: Not on file    Highest education level: Not on file   Occupational History    Not on file   Tobacco Use    Smoking status: Never Smoker    Smokeless tobacco: Never Used   Vaping Use    Vaping Use: Never used   Substance and Sexual Activity    Alcohol use: No    Drug use: No    Sexual activity: Yes     Partners: Male     Comment: uses condoms   Other Topics Concern    Not on file   Social History Narrative    Not on file     Social Determinants of Health     Financial Resource Strain: Medium Risk    Difficulty of Paying Living Expenses: Somewhat hard   Food Insecurity: Food Insecurity Present    Worried About Running Out of Food in the Last Year: Sometimes true    Diana of Food in the Last Year: Sometimes true   Transportation Needs:     Lack of Transportation (Medical): Not on file    Lack of Transportation (Non-Medical):  Not on file   Physical Activity:     Days of Exercise per Week: Not on file    Minutes of Exercise per Session: Not on file   Stress:     Feeling of Stress : Not on file   Social Connections:     Frequency of Communication with Friends and Family: Not on file    Frequency of Social Gatherings with Friends and Family: Not on file    Attends Mandaen Services: Not on file    Active Member of [address] [place] [address] [employer] or Organizations: Not on file    Attends Club or Organization Meetings: Not on file    Marital Status: Not on file   Intimate Partner Violence:     Fear of Current or Ex-Partner: Not on file    Emotionally Abused: Not on file    Physically Abused: Not on file    Sexually Abused: Not on file   Housing Stability:     Unable to Pay for Housing in the Last Year: Not on file    Number of [place] in the Last Year: Not on file    Unstable Housing in the Last Year: Not on file       Family History   Problem Relation Age of Onset    Depression Mother     No Known Problems Father     Allergy (Severe) Maternal Grandmother     Other Maternal Grandmother         fibromyalgia    Depression Maternal Grandmother     Cancer Paternal Grandmother         Pancreatic     Colon Cancer Neg Hx     Colon Polyps Neg Hx          I have reviewed the patient's past medical history, past surgical history, allergies, medications, social and family history and I have made updates where appropriate. Review of Systems  Positive responses are highlighted in bold    Constitutional:  Fever, Chills, Night Sweats, Fatigue, Unexpected changes in weight  Eyes:  Eye discharge, Eye pain, Eye redness, Visual disturbances   HENT:  Ear pain, Tinnitus, Nosebleeds, Trouble swallowing, Hearing loss, Sore throat  Cardiovascular:  Chest Pain, Palpitations, Orthopnea, Paroxysmal Nocturnal Dyspnea  Respiratory:  Cough, Wheezing, Shortness of breath, Chest tightness, Apnea  Gastrointestinal:  Nausea, Vomiting, Diarrhea, Constipation, Heartburn, Blood in stool  Genitourinary:  Difficulty or painful urination, Flank pain, Change in frequency, Urgency  Skin:  Color change, Rash, Itching, Wound  Psychiatric:  Hallucinations, Anxiety, Depression, Suicidal ideation  Hematological:  Enlarged glands, Easy bleeding, Easily bruising  Musculoskeletal:  Joint pain, Back pain, Gait problems, Joint swelling, Myalgias  Neurological:  Dizziness, Headaches, Presyncope, Numbness, Seizures, Tremors  Allergy:  Environmental allergies, Food allergies  Endocrine:  Heat Intolerance, Cold Intolerance, Polydipsia, Polyphagia, Polyuria      PHYSICAL EXAM:  Vitals:    11/16/21 1102   BP: 122/78   Pulse: 93   Resp: 14   Temp: 99.2 °F (37.3 °C)   TempSrc: Oral   Weight: 184 lb 3.2 oz (83.6 kg)   Height: 5' 5\" (1.651 m)     Body mass index is 30.65 kg/m².          VS Reviewed  General Appearance: A&O x 3, No acute distress,well developed and well- nourished  Head: normocephalic and atraumatic  Eyes: pupils equal, round, and reactive to light, extraocular eye for anxiety and depression. Susan released without restrictions. PATIENT COUNSELING    Counseling was provided today regarding the following topics: Healthy eating habits, Regular exercise, substance abuse and healthy sleep habits. Susan received counseling on the following healthy behaviors: nutrition    Patient given educational materials on: See Attached    I have instructed Susan to complete a self tracking handout on none and instructed them to bring it with them to her next appointment. Barriers to learning and self management: none    Discussed use, benefit, and side effects of prescribed medications. Barriers to medication compliance addressed. All patient questions answered. Pt voiced understanding.        Electronically signed by ALBERTA Roberts Caro, CNP on 11/16/2021 at 11:18 AM

## 2021-12-16 ENCOUNTER — OFFICE VISIT (OUTPATIENT)
Dept: FAMILY MEDICINE CLINIC | Age: 21
End: 2021-12-16
Payer: COMMERCIAL

## 2021-12-16 VITALS
WEIGHT: 186.25 LBS | HEART RATE: 113 BPM | RESPIRATION RATE: 14 BRPM | TEMPERATURE: 97.3 F | SYSTOLIC BLOOD PRESSURE: 120 MMHG | HEIGHT: 65 IN | BODY MASS INDEX: 31.03 KG/M2 | OXYGEN SATURATION: 97 % | DIASTOLIC BLOOD PRESSURE: 78 MMHG

## 2021-12-16 DIAGNOSIS — F41.1 GENERALIZED ANXIETY DISORDER: ICD-10-CM

## 2021-12-16 DIAGNOSIS — Z72.51 UNPROTECTED SEXUAL INTERCOURSE: ICD-10-CM

## 2021-12-16 DIAGNOSIS — F33.2 SEVERE EPISODE OF RECURRENT MAJOR DEPRESSIVE DISORDER, WITHOUT PSYCHOTIC FEATURES (HCC): ICD-10-CM

## 2021-12-16 DIAGNOSIS — N93.9 VAGINAL BLEEDING: ICD-10-CM

## 2021-12-16 DIAGNOSIS — T65.91XA ALLERGIC REACTION TO CHEMICAL SUBSTANCE, ACCIDENTAL OR UNINTENTIONAL, INITIAL ENCOUNTER: Primary | ICD-10-CM

## 2021-12-16 DIAGNOSIS — R11.0 NAUSEA: ICD-10-CM

## 2021-12-16 LAB
CHLAMYDIA TRACHOMATIS BY RT-PCR: NOT DETECTED
CONTROL: NORMAL
CT/NG SOURCE: NORMAL
NEISSERIA GONORRHOEAE BY RT-PCR: NOT DETECTED
PREGNANCY TEST URINE, POC: NORMAL

## 2021-12-16 PROCEDURE — 99214 OFFICE O/P EST MOD 30 MIN: CPT | Performed by: NURSE PRACTITIONER

## 2021-12-16 PROCEDURE — 81025 URINE PREGNANCY TEST: CPT | Performed by: NURSE PRACTITIONER

## 2021-12-16 RX ORDER — SERTRALINE HYDROCHLORIDE 100 MG/1
100 TABLET, FILM COATED ORAL DAILY
Qty: 30 TABLET | Refills: 2 | Status: SHIPPED | OUTPATIENT
Start: 2021-12-16 | End: 2022-03-17 | Stop reason: ALTCHOICE

## 2021-12-16 NOTE — PATIENT INSTRUCTIONS
[facility]  [address] [facility], [address] [place] [address]    [phone]

## 2021-12-16 NOTE — PROGRESS NOTES
Chief Complaint   Patient presents with    Follow-up     4 week, pt states there has been no improvement        History obtained from chart review and the patient. SUBJECTIVE:  Montana Perez is a 24 y.o. female that presents today for anxiety and depression    Anxiety/MDD    Feeling depressed daily. She still gets intrusive suicidal thoughts, but denies any plan. Has intrusive thoughts probably 4 times/week. She is struggling falling asleep, has difficulties waking up then. Appetite is good. Motivation and energy are low. She still gets mood swings. Anxiety is still elevated, having panic attacks about once a week. Triggers are general things, and also social interactions. She hasn't seen the Psychiatrist yet. She hasn't called yet to schedule. She reports that every time she showers and uses shampoo, she breaks out in a rash on her face. One time she tried a new shampoo, she broke out in a rash and she also started having itchy scratchy throat. She has tried putting Aquaphor on it. The rash will usually go away by the morning. She also complains of being really sick to her stomach for about 2 weeks. The nausea is in the mornings and also randomly throughout the day. She is using the patch birth control. She started having some light vaginal bleeding last week and is now heavily bleeding. She is sexually active with boyfriend, condom use is inconsistent. Menses is usually the last week of the month. She is having some mild cramping, feels like a menstrual period. [facility]     Age/Gender Health Maintenance    Lipid - 35  DM Screen - 35  Colon Cancer Screening - 48  Lung Cancer Screening (Age 54 to [de-identified] with 30 pack year hx, current smoker or quit within past 15 years) - n/a    Tetanus - needs  Influenza Vaccine - 10/18  Pneumonia Vaccine - 65  Zostavax - 50   HPV Vaccine - needs    Breast Cancer Screening - 50  Cervical Cancer Screening - 21  Osteoporosis Screening - 65  Chlamydia Screen - 16    PSA testing discussion - n/a  AAA Screening - n/a    Falls screening - n/a    Current Outpatient Medications   Medication Sig Dispense Refill    sertraline (ZOLOFT) 100 MG tablet Take 1 tablet by mouth daily 30 tablet 2    buPROPion (WELLBUTRIN XL) 150 MG extended release tablet Take 1 tablet by mouth every morning 30 tablet 3    NONFORMULARY Birth control       ibuprofen (ADVIL;MOTRIN) 200 MG tablet Take 200 mg by mouth every 6 hours as needed for Pain      Naproxen Sodium (ALEVE PO) Take by mouth as needed       No current facility-administered medications for this visit. Orders Placed This Encounter   Medications    sertraline (ZOLOFT) 100 MG tablet     Sig: Take 1 tablet by mouth daily     Dispense:  30 tablet     Refill:  2         All medications reviewed and reconciled, including OTC and herbal medications. Updated list given to patient. Patient Active Problem List   Diagnosis    Major depressive disorder, recurrent episode, moderate (HCC)    Attention deficit hyperactivity disorder (ADHD), predominantly inattentive type    Generalized anxiety disorder    Mood disorder ([facility] [address]       Past Medical History:   Diagnosis Date    ADHD (attention deficit hyperactivity disorder)     Anxiety     Attention deficit hyperactivity disorder (ADHD), predominantly inattentive type 1/16/2018    Blood in stool     Convergent comitant strabismus 2014    Diarrhea     PCOS (polycystic ovarian syndrome)        No past surgical history on file.     No Known Allergies    Social History     Socioeconomic History    Marital status: Single     Spouse name: Not on file    Number of children: Not on file    Years of education: Not on file    Highest education level: Not on file   Occupational History    Not on file   Tobacco Use    Smoking status: Never Smoker    Smokeless tobacco: Never Used   Vaping Use    Vaping Use: Never used   Substance and Sexual Activity    Alcohol use: No    Drug use: No    Sexual activity: Yes     Partners: Male     Comment: uses condoms   Other Topics Concern    Not on file   Social History Narrative    Not on file     Social Determinants of Health     Financial Resource Strain: Medium Risk    Difficulty of Paying Living Expenses: Somewhat hard   Food Insecurity: Food Insecurity Present    Worried About Running Out of Food in the Last Year: Sometimes true    Diana of Food in the Last Year: Sometimes true   Transportation Needs:     Lack of Transportation (Medical): Not on file    Lack of Transportation (Non-Medical):  Not on file   Physical Activity:     Days of Exercise per Week: Not on file    Minutes of Exercise per Session: Not on file   Stress:     Feeling of Stress : Not on file   Social Connections:     Frequency of Communication with Friends and Family: Not on file    Frequency of Social Gatherings with Friends and Family: Not on file    Attends Jew Services: Not on file    Active Member of [address] [place] [address] or Organizations: Not on file    Attends Club or Organization Meetings: Not on file    Marital Status: Not on file   Intimate Partner Violence:     Fear of Current or Ex-Partner: Not on file    Emotionally Abused: Not on file    Physically Abused: Not on file    Sexually Abused: Not on file   Housing Stability:     Unable to Pay for Housing in the Last Year: Not on file    Number of [place] in the Last Year: Not on file    Unstable Housing in the Last Year: Not on file       Family History   Problem Relation Age of Onset    Depression Mother     No Known Problems Father     Allergy (Severe) Maternal Grandmother     Other Maternal Grandmother         fibromyalgia    Depression Maternal Grandmother     Cancer Paternal Grandmother         Pancreatic     Colon Cancer Neg Hx     Colon Polyps Neg Hx          I have reviewed the patient's past medical history, past surgical history, allergies, medications, social and family history and I have made updates where appropriate. Review of Systems  Positive responses are highlighted in bold    Constitutional:  Fever, Chills, Night Sweats, Fatigue, Unexpected changes in weight  Eyes:  Eye discharge, Eye pain, Eye redness, Visual disturbances   HENT:  Ear pain, Tinnitus, Nosebleeds, Trouble swallowing, Hearing loss, Sore throat  Cardiovascular:  Chest Pain, Palpitations, Orthopnea, Paroxysmal Nocturnal Dyspnea  Respiratory:  Cough, Wheezing, Shortness of breath, Chest tightness, Apnea  Gastrointestinal:  Nausea, Vomiting, Diarrhea, Constipation, Heartburn, Blood in stool  Genitourinary:  Difficulty or painful urination, Flank pain, Change in frequency, Urgency  Skin:  Color change, Rash, Itching, Wound  Psychiatric:  Hallucinations, Anxiety, Depression, Suicidal ideation  Hematological:  Enlarged glands, Easy bleeding, Easily bruising  Musculoskeletal:  Joint pain, Back pain, Gait problems, Joint swelling, Myalgias  Neurological:  Dizziness, Headaches, Presyncope, Numbness, Seizures, Tremors  Allergy:  Environmental allergies, Food allergies  Endocrine:  Heat Intolerance, Cold Intolerance, Polydipsia, Polyphagia, Polyuria      PHYSICAL EXAM:  Vitals:    12/16/21 0903   BP: 120/78   Pulse: 113   Resp: 14   Temp: 97.3 °F (36.3 °C)   TempSrc: Oral   SpO2: 97%   Weight: 186 lb 4 oz (84.5 kg)   Height: 5' 5\" (1.651 m)     Body mass index is 30.99 kg/m².          VS Reviewed  General Appearance: A&O x 3, No acute distress,well developed and well- nourished  Head: normocephalic and atraumatic  Eyes: pupils equal, round, and reactive to light, extraocular eye movements intact, conjunctivae and eye lids without erythema  Neck: supple and non-tender without mass, no thyromegaly or thyroid nodules, no cervical lymphadenopathy  Pulmonary/Chest: clear to auscultation bilaterally- no wheezes, rales or rhonchi, normal air movement, no respiratory distress or retractions  Cardiovascular: S1 and S2 auscultated w/ RRR. No murmurs, rubs, clicks, or gallops, distal pulses intact. Abdomen: soft, non-tender, non-distended, bowl sounds physiologic,  no rebound or guarding, no masses or hernias noted. Liver and spleen without enlargement. Extremities: no cyanosis, clubbing or edema of the lower extremities  Musculoskeletal: No joint swelling or gross deformity   Neuro:  Alert, 5/5 strength globally and symmetrically  Psych: Affect and mood are flat and depressed. Thought process is normal without evidence of psychosis. Good insight and appropriate interaction. Cognition and memory appear to be intact. Skin: warm and dry, no rash or erythema  Lymph:  No cervical, auricular or supraclavicular lymph nodes palpated    ASSESSMENT & PLAN  Dorrie Olszewski was seen today for follow-up. Diagnoses and all orders for this visit:    Allergic reaction to chemical substance, accidental or unintentional, initial encounter  -     [facility], Denis Monson, [place], Allergy, [place]    Generalized anxiety disorder  -     sertraline (ZOLOFT) 100 MG tablet; Take 1 tablet by mouth daily    Severe episode of recurrent major depressive disorder, without psychotic features (HCC)  -     sertraline (ZOLOFT) 100 MG tablet; Take 1 tablet by mouth daily    Vaginal bleeding  -     POCT urine pregnancy  -     C. Trachomatis / N. Gonorrhoeae, DNA Probe; Future    Nausea  -     POCT urine pregnancy    Unprotected sexual intercourse  -     C. Trachomatis / N. Gonorrhoeae, DNA Probe; Future      - con't Wellbutrin, increase Zoloft  - information given to patient to contact psychiatry  - urine preg negative, will also send out for chlamydia/gonorrhea  - rec'd she f/u with OBGYN    DISPOSITION    Return in about 4 weeks (around 1/13/2022) for anxiety and depression. Susan released without restrictions.       PATIENT COUNSELING    Counseling was provided today regarding the following topics: Healthy eating habits, Regular exercise, substance abuse and healthy sleep habits. Susan received counseling on the following healthy behaviors: nutrition    Patient given educational materials on: See Attached    I have instructed Susan to complete a self tracking handout on none and instructed them to bring it with them to her next appointment. Barriers to learning and self management: none    Discussed use, benefit, and side effects of prescribed medications. Barriers to medication compliance addressed. All patient questions answered. Pt voiced understanding.        Electronically signed by ALBERTA Perez CNP on 12/16/2021 at 9:46 AM

## 2021-12-17 ENCOUNTER — TELEPHONE (OUTPATIENT)
Dept: FAMILY MEDICINE CLINIC | Age: 21
End: 2021-12-17

## 2021-12-17 NOTE — TELEPHONE ENCOUNTER
----- Message from ALBERTA De Leon CNP sent at 12/16/2021  4:44 PM EST -----  Let pt know her pregnancy and gonorrhea and chlamydia test are negative, continue with plan discussed in office

## 2022-02-11 ENCOUNTER — OFFICE VISIT (OUTPATIENT)
Dept: FAMILY MEDICINE CLINIC | Age: 22
End: 2022-02-11
Payer: COMMERCIAL

## 2022-02-11 VITALS
WEIGHT: 191.2 LBS | OXYGEN SATURATION: 98 % | RESPIRATION RATE: 12 BRPM | HEIGHT: 65 IN | HEART RATE: 85 BPM | SYSTOLIC BLOOD PRESSURE: 122 MMHG | TEMPERATURE: 98.4 F | BODY MASS INDEX: 31.86 KG/M2 | DIASTOLIC BLOOD PRESSURE: 70 MMHG

## 2022-02-11 DIAGNOSIS — F33.2 SEVERE EPISODE OF RECURRENT MAJOR DEPRESSIVE DISORDER, WITHOUT PSYCHOTIC FEATURES (HCC): ICD-10-CM

## 2022-02-11 DIAGNOSIS — H60.02 ABSCESS OF LEFT EXTERNAL EAR: ICD-10-CM

## 2022-02-11 DIAGNOSIS — F39 MOOD DISORDER (HCC): Primary | ICD-10-CM

## 2022-02-11 PROCEDURE — 99213 OFFICE O/P EST LOW 20 MIN: CPT | Performed by: NURSE PRACTITIONER

## 2022-02-11 RX ORDER — DIPHENHYDRAMINE HCL 25 MG
25 CAPSULE ORAL EVERY 6 HOURS PRN
COMMUNITY

## 2022-02-11 RX ORDER — LAMOTRIGINE 25 MG/1
TABLET ORAL
Qty: 60 TABLET | Refills: 1 | Status: SHIPPED | OUTPATIENT
Start: 2022-02-11 | End: 2022-03-17

## 2022-02-11 ASSESSMENT — PATIENT HEALTH QUESTIONNAIRE - PHQ9
1. LITTLE INTEREST OR PLEASURE IN DOING THINGS: 3
SUM OF ALL RESPONSES TO PHQ QUESTIONS 1-9: 20
SUM OF ALL RESPONSES TO PHQ QUESTIONS 1-9: 22
9. THOUGHTS THAT YOU WOULD BE BETTER OFF DEAD, OR OF HURTING YOURSELF: 2
SUM OF ALL RESPONSES TO PHQ QUESTIONS 1-9: 22
SUM OF ALL RESPONSES TO PHQ QUESTIONS 1-9: 22
8. MOVING OR SPEAKING SO SLOWLY THAT OTHER PEOPLE COULD HAVE NOTICED. OR THE OPPOSITE, BEING SO FIGETY OR RESTLESS THAT YOU HAVE BEEN MOVING AROUND A LOT MORE THAN USUAL: 2
5. POOR APPETITE OR OVEREATING: 3
3. TROUBLE FALLING OR STAYING ASLEEP: 3
4. FEELING TIRED OR HAVING LITTLE ENERGY: 3
6. FEELING BAD ABOUT YOURSELF - OR THAT YOU ARE A FAILURE OR HAVE LET YOURSELF OR YOUR FAMILY DOWN: 3
SUM OF ALL RESPONSES TO PHQ9 QUESTIONS 1 & 2: 6
7. TROUBLE CONCENTRATING ON THINGS, SUCH AS READING THE NEWSPAPER OR WATCHING TELEVISION: 0
2. FEELING DOWN, DEPRESSED OR HOPELESS: 3

## 2022-02-11 NOTE — PROGRESS NOTES
Chief Complaint   Patient presents with   Orman Mortimer     pt states that she thought there was a pimple in her left ear on Monday, tried popping it and now it hurts    [facility] Maintenance     No recent pap       History obtained from chart review and the patient. SUBJECTIVE:  Abbe Gloria is a 24 y.o. female that presents today for anxiety and depression and left ear pain    She had a \"pimple\" in her left ear. Onset Monday. She tried to pop it and was able to get some drainage from it. It is still painful. Anxiety/MDD    She stopped all her antidepressants because they weren't working. She was really depressed a couple weeks ago, having frequent mood swings. She was also having a lot of suicidal thoughts, almost admitted herself. Having intrusive thoughts almost daily. She denies any plan. She lives with friends, she got a new roommate and this has helped some. She does have good support. She is in good communication with her parents. Appetite has increased, tends to overeat and is gaining weight. She finally made an appt with psychiatry, has appt in April with Dr Negin Noel. Age/Gender Health Maintenance    Lipid - 35  DM Screen - 35  Colon Cancer Screening - 48  Lung Cancer Screening (Age 54 to [de-identified] with 30 pack year hx, current smoker or quit within past 15 years) - n/a    Tetanus - needs  Influenza Vaccine - 10/18  Pneumonia Vaccine - 65  Zostavax - 50   HPV Vaccine - needs    Breast Cancer Screening - 50  Cervical Cancer Screening - 21  Osteoporosis Screening - 65  Chlamydia Screen - 16    PSA testing discussion - n/a  AAA Screening - n/a    Falls screening - n/a    Current Outpatient Medications   Medication Sig Dispense Refill    diphenhydrAMINE (BENADRYL ALLERGY) 25 MG capsule Take 25 mg by mouth every 6 hours as needed for Itching      lamoTRIgine (LAMICTAL) 25 MG tablet Take 1 tablet by mouth nightly for 2 weeks, then take 2 tablets by mouth nightly.  60 tablet 1    NONFORMULARY Birth control       ibuprofen (ADVIL;MOTRIN) 200 MG tablet Take 200 mg by mouth every 6 hours as needed for Pain      Naproxen Sodium (ALEVE PO) Take by mouth as needed      sertraline (ZOLOFT) 100 MG tablet Take 1 tablet by mouth daily (Patient not taking: Reported on 2/11/2022) 30 tablet 2    buPROPion (WELLBUTRIN XL) 150 MG extended release tablet Take 1 tablet by mouth every morning (Patient not taking: Reported on 2/11/2022) 30 tablet 3     No current facility-administered medications for this visit. Orders Placed This Encounter   Medications    lamoTRIgine (LAMICTAL) 25 MG tablet     Sig: Take 1 tablet by mouth nightly for 2 weeks, then take 2 tablets by mouth nightly. Dispense:  60 tablet     Refill:  1         All medications reviewed and reconciled, including OTC and herbal medications. Updated list given to patient. Patient Active Problem List   Diagnosis    Major depressive disorder, recurrent episode, moderate (HCC)    Attention deficit hyperactivity disorder (ADHD), predominantly inattentive type    Generalized anxiety disorder    Mood disorder ([facility] [address]       Past Medical History:   Diagnosis Date    ADHD (attention deficit hyperactivity disorder)     Anxiety     Attention deficit hyperactivity disorder (ADHD), predominantly inattentive type 1/16/2018    Blood in stool     Convergent comitant strabismus 2014    Diarrhea     PCOS (polycystic ovarian syndrome)        History reviewed. No pertinent surgical history.     No Known Allergies    Social History     Socioeconomic History    Marital status: Single     Spouse name: Not on file    Number of children: Not on file    Years of education: Not on file    Highest education level: Not on file   Occupational History    Not on file   Tobacco Use    Smoking status: Never Smoker    Smokeless tobacco: Never Used   Vaping Use    Vaping Use: Every day    Substances: Nicotine, Flavoring    Devices: Pre-filled or refillable cartridge, Refillable tank   Substance and Sexual Activity    Alcohol use: No    Drug use: No    Sexual activity: Yes     Partners: Male     Comment: uses condoms   Other Topics Concern    Not on file   Social History Narrative    Not on file     Social Determinants of Health     Financial Resource Strain: Medium Risk    Difficulty of Paying Living Expenses: Somewhat hard   Food Insecurity: Food Insecurity Present    Worried About Running Out of Food in the Last Year: Sometimes true    Diana of Food in the Last Year: Sometimes true   Transportation Needs:     Lack of Transportation (Medical): Not on file    Lack of Transportation (Non-Medical):  Not on file   Physical Activity:     Days of Exercise per Week: Not on file    Minutes of Exercise per Session: Not on file   Stress:     Feeling of Stress : Not on file   Social Connections:     Frequency of Communication with Friends and Family: Not on file    Frequency of Social Gatherings with Friends and Family: Not on file    Attends Rastafarian Services: Not on file    Active Member of [address] [place] [address] or Organizations: Not on file    Attends Club or Organization Meetings: Not on file    Marital Status: Not on file   Intimate Partner Violence:     Fear of Current or Ex-Partner: Not on file    Emotionally Abused: Not on file    Physically Abused: Not on file    Sexually Abused: Not on file   Housing Stability:     Unable to Pay for Housing in the Last Year: Not on file    Number of [place] in the Last Year: Not on file    Unstable Housing in the Last Year: Not on file       Family History   Problem Relation Age of Onset    Depression Mother     No Known Problems Father     Allergy (Severe) Maternal Grandmother     Other Maternal Grandmother         fibromyalgia    Depression Maternal Grandmother     Cancer Paternal Grandmother         Pancreatic     Colon Cancer Neg Hx     Colon Polyps Neg Hx          I have reviewed the patient's past medical history, past surgical history, allergies, medications, social and family history and I have made updates where appropriate. Review of Systems  Positive responses are highlighted in bold    Constitutional:  Fever, Chills, Night Sweats, Fatigue, Unexpected changes in weight  Eyes:  Eye discharge, Eye pain, Eye redness, Visual disturbances   HENT:  Ear pain, Tinnitus, Nosebleeds, Trouble swallowing, Hearing loss, Sore throat  Cardiovascular:  Chest Pain, Palpitations, Orthopnea, Paroxysmal Nocturnal Dyspnea  Respiratory:  Cough, Wheezing, Shortness of breath, Chest tightness, Apnea  Gastrointestinal:  Nausea, Vomiting, Diarrhea, Constipation, Heartburn, Blood in stool  Genitourinary:  Difficulty or painful urination, Flank pain, Change in frequency, Urgency  Skin:  Color change, Rash, Itching, Wound  Psychiatric:  Hallucinations, Anxiety, Depression, Suicidal ideation  Hematological:  Enlarged glands, Easy bleeding, Easily bruising  Musculoskeletal:  Joint pain, Back pain, Gait problems, Joint swelling, Myalgias  Neurological:  Dizziness, Headaches, Presyncope, Numbness, Seizures, Tremors  Allergy:  Environmental allergies, Food allergies  Endocrine:  Heat Intolerance, Cold Intolerance, Polydipsia, Polyphagia, Polyuria      PHYSICAL EXAM:  Vitals:    02/11/22 1057   BP: 122/70   Pulse: 85   Resp: 12   Temp: 98.4 °F (36.9 °C)   TempSrc: Oral   SpO2: 98%   Weight: 191 lb 3.2 oz (86.7 kg)   Height: 5' 5\" (1.651 m)     Body mass index is 31.82 kg/m².          VS Reviewed  General Appearance: A&O x 3, No acute distress,well developed and well- nourished  Head: normocephalic and atraumatic  Eyes: pupils equal, round, and reactive to light, extraocular eye movements intact, conjunctivae and eye lids without erythema  ENT: right ear canal and TM normal left TM normal, left ear canal/external ear with small healing scabbed abscess  Neck: supple and non-tender without mass, no thyromegaly or thyroid nodules, no cervical lymphadenopathy  Pulmonary/Chest: clear to auscultation bilaterally- no wheezes, rales or rhonchi, normal air movement, no respiratory distress or retractions  Cardiovascular: S1 and S2 auscultated w/ RRR. No murmurs, rubs, clicks, or gallops, distal pulses intact. Abdomen: soft, non-tender, non-distended, bowl sounds physiologic,  no rebound or guarding, no masses or hernias noted. Liver and spleen without enlargement. Extremities: no cyanosis, clubbing or edema of the lower extremities  Musculoskeletal: No joint swelling or gross deformity   Neuro:  Alert, 5/5 strength globally and symmetrically  Psych: Affect and mood are flat and depressed. Thought process is normal without evidence of psychosis. Good insight and appropriate interaction. Cognition and memory appear to be intact. Skin: warm and dry, no rash or erythema  Lymph:  No cervical, auricular or supraclavicular lymph nodes palpated    ASSESSMENT & PLAN  Morena Marcus was seen today for otalgia and health maintenance. Diagnoses and all orders for this visit:    Mood disorder ([address]  -     lamoTRIgine (LAMICTAL) 25 MG tablet; Take 1 tablet by mouth nightly for 2 weeks, then take 2 tablets by mouth nightly. Severe episode of recurrent major depressive disorder, without psychotic features ([address]  -     lamoTRIgine (LAMICTAL) 25 MG tablet; Take 1 tablet by mouth nightly for 2 weeks, then take 2 tablets by mouth nightly. Abscess of left external ear      - monitor ear for now, appears to be healing. Advised her to leave it alone and not pick at it  - start Lamictal for depression/mood swings  - discussed benefits, risks and side effects  - suicidal precautions  - f/u 4 weeks and also f/u with psychiatry as scheduled      DISPOSITION    Return in about 4 weeks (around 3/11/2022) for MDD. Susan released without restrictions.       PATIENT COUNSELING    Counseling was provided today regarding the following topics: Healthy eating habits, Regular exercise, substance abuse and healthy sleep habits. Susan received counseling on the following healthy behaviors: nutrition    Patient given educational materials on: See Attached    I have instructed Susan to complete a self tracking handout on none and instructed them to bring it with them to her next appointment. Barriers to learning and self management: none    Discussed use, benefit, and side effects of prescribed medications. Barriers to medication compliance addressed. All patient questions answered. Pt voiced understanding.        Electronically signed by ALBERTA Boucher CNP on 2/11/2022 at 11:37 AM

## 2022-03-17 ENCOUNTER — OFFICE VISIT (OUTPATIENT)
Dept: FAMILY MEDICINE CLINIC | Age: 22
End: 2022-03-17
Payer: COMMERCIAL

## 2022-03-17 VITALS
HEIGHT: 65 IN | OXYGEN SATURATION: 99 % | HEART RATE: 103 BPM | SYSTOLIC BLOOD PRESSURE: 138 MMHG | WEIGHT: 185.8 LBS | TEMPERATURE: 98 F | DIASTOLIC BLOOD PRESSURE: 88 MMHG | RESPIRATION RATE: 14 BRPM | BODY MASS INDEX: 30.96 KG/M2

## 2022-03-17 DIAGNOSIS — F39 MOOD DISORDER (HCC): ICD-10-CM

## 2022-03-17 DIAGNOSIS — Z51.81 THERAPEUTIC DRUG MONITORING: Primary | ICD-10-CM

## 2022-03-17 DIAGNOSIS — J01.90 ACUTE RHINOSINUSITIS: ICD-10-CM

## 2022-03-17 DIAGNOSIS — F33.2 SEVERE EPISODE OF RECURRENT MAJOR DEPRESSIVE DISORDER, WITHOUT PSYCHOTIC FEATURES (HCC): ICD-10-CM

## 2022-03-17 LAB
Lab: 1234
QC PASS/FAIL: NORMAL
SARS-COV-2 RDRP RESP QL NAA+PROBE: NEGATIVE

## 2022-03-17 PROCEDURE — 99214 OFFICE O/P EST MOD 30 MIN: CPT | Performed by: NURSE PRACTITIONER

## 2022-03-17 PROCEDURE — 87635 SARS-COV-2 COVID-19 AMP PRB: CPT | Performed by: NURSE PRACTITIONER

## 2022-03-17 RX ORDER — NORELGESTROMIN AND ETHINYL ESTRADIOL 150; 35 UG/D; UG/D
1 PATCH TRANSDERMAL
COMMUNITY
Start: 2022-01-22 | End: 2022-05-13 | Stop reason: SDUPTHER

## 2022-03-17 RX ORDER — AZITHROMYCIN 250 MG/1
250 TABLET, FILM COATED ORAL SEE ADMIN INSTRUCTIONS
Qty: 6 TABLET | Refills: 0 | Status: SHIPPED | OUTPATIENT
Start: 2022-03-17 | End: 2022-03-22

## 2022-03-17 RX ORDER — LAMOTRIGINE 100 MG/1
100 TABLET ORAL DAILY
Qty: 90 TABLET | Refills: 0 | Status: SHIPPED | OUTPATIENT
Start: 2022-03-17 | End: 2022-05-13

## 2022-03-17 NOTE — PROGRESS NOTES
SUBJECTIVE:  Stepan Garrett is a 24 y.o. y/o female that presents with Nasal Congestion, Pharyngitis (started saturday), Chills, Fever, and Fatigue    HPI:      Symptoms have been present for 6 day(s). Symptoms are unchanged since they initially started. Fever? Yes - 100  Runny nose or congestion? Yes   Cough? Yes, more PND  Sore throat? Yes  Headache, fatigue, joint pains, muscle aches? Yes - fatigue  Shortness of breath/Wheezing? No  Nausea/Vomiting/Diarrhea? No  Double Sickening? No  Sick contacts? No  Known COVID exposures of RFs? No  Smoker? No  Preexisting Respiratory conditions? No    Patient has tried OTC meds without improvement. MDD  She is taking Lamictal and is tolerating it well. She has not seen psychiatry. The Lamictal has helped with the mood swings. Feels depressed maybe 4 days/week. Denies any SI/HI. She does still have anxiety, the Lamictal doesn't help with that. She does feel like the anxiety is manageable though.     Past Medical History:   Diagnosis Date    ADHD (attention deficit hyperactivity disorder)     Anxiety     Attention deficit hyperactivity disorder (ADHD), predominantly inattentive type 1/16/2018    Blood in stool     Convergent comitant strabismus 2014    Diarrhea     PCOS (polycystic ovarian syndrome)        Social History     Socioeconomic History    Marital status: Single     Spouse name: Not on file    Number of children: Not on file    Years of education: Not on file    Highest education level: Not on file   Occupational History    Not on file   Tobacco Use    Smoking status: Never Smoker    Smokeless tobacco: Never Used   Vaping Use    Vaping Use: Every day    Substances: Nicotine, Flavoring    Devices: Pre-filled or refillable cartridge, Refillable tank   Substance and Sexual Activity    Alcohol use: No    Drug use: No    Sexual activity: Yes     Partners: Male     Comment: uses condoms   Other Topics Concern    Not on file   Social History Narrative    Not on file     Social Determinants of Health     Financial Resource Strain: Medium Risk    Difficulty of Paying Living Expenses: Somewhat hard   Food Insecurity: Food Insecurity Present    Worried About Running Out of Food in the Last Year: Sometimes true    Diana of Food in the Last Year: Sometimes true   Transportation Needs:     Lack of Transportation (Medical): Not on file    Lack of Transportation (Non-Medical): Not on file   Physical Activity:     Days of Exercise per Week: Not on file    Minutes of Exercise per Session: Not on file   Stress:     Feeling of Stress : Not on file   Social Connections:     Frequency of Communication with Friends and Family: Not on file    Frequency of Social Gatherings with Friends and Family: Not on file    Attends [employer]ist Services: Not on file    Active Member of [address] [place] [address] [employer] or Organizations: Not on file    Attends Club or Organization Meetings: Not on file    Marital Status: Not on file   Intimate Partner Violence:     Fear of Current or Ex-Partner: Not on file    Emotionally Abused: Not on file    Physically Abused: Not on file    Sexually Abused: Not on file   Housing Stability:     Unable to Pay for Housing in the Last Year: Not on file    Number of [place] in the Last Year: Not on file    Unstable Housing in the Last Year: Not on file       Family History   Problem Relation Age of Onset    Depression Mother     No Known Problems Father     Allergy (Severe) Maternal Grandmother     Other Maternal Grandmother         fibromyalgia    Depression Maternal Grandmother     Cancer Paternal Grandmother         Pancreatic     Colon Cancer Neg Hx     Colon Polyps Neg Hx            OBJECTIVE:  /88   Pulse 103   Temp 98 °F (36.7 °C) (Oral)   Resp 14   Ht 5' 5\" (1.651 m)   Wt 185 lb 12.8 oz (84.3 kg)   SpO2 99%   BMI 30.92 kg/m²   General appearance: alert, well appearing, and in no distress.   ENT exam reveals - bilateral TM normal without fluid or infection, pharynx erythematous without exudate and nasal mucosa pale and congested. CVS exam: normal rate, regular rhythm, normal S1, S2, no murmurs, rubs, clicks or gallops. Chest:clear to auscultation, no wheezes, rales or rhonchi, symmetric air entry. Abdominal exam: soft, nontender, nondistended, no masses or organomegaly. Extremities:  No clubbing, cyanosis or edema  Skin exam - normal coloration and turgor, no rashes, no suspicious skin lesions noted. Psych -  Affect appropriate. Thought process is normal without evidence of depression or psychosis. Good insight and appropriae interaction. Cognition and memory appear to be intact. ASSESSMENT & PLAN  Raymundo Guerrero was seen today for nasal congestion, pharyngitis, chills, fever and fatigue. Diagnoses and all orders for this visit:    Therapeutic drug monitoring  -     Lamotrigine Level; Future    Mood disorder (HCC)  -     lamoTRIgine (LAMICTAL) 100 MG tablet; Take 1 tablet by mouth daily    Severe episode of recurrent major depressive disorder, without psychotic features (HCC)  -     lamoTRIgine (LAMICTAL) 100 MG tablet; Take 1 tablet by mouth daily    Acute rhinosinusitis  -     azithromycin (ZITHROMAX) 250 MG tablet; Take 1 tablet by mouth See Admin Instructions for 5 days 500mg on day 1 followed by 250mg on days 2 - 5  -     POCT COVID-19 Rapid, NAAT    - COVID negative, reasonable to start Abx at this point  - increase Lamictal to 100 mg  - check lamotrigine level in 6-8 weeks    Return in about 2 months (around 5/17/2022) for MDD.     -Patient advised to call immediately or go to ER if any worsening of symptoms  -Patient counseled on conservative care including fluids, rest and OTC meds    Susan received counseling on the following healthy behaviors: medication adherence  Reviewed prior labs and health maintenance. Continue current medications, diet and exercise.   Discussed use, benefit, and side effects of prescribed medications. Barriers to medication compliance addressed. Patient given educational materials - see patient instructions. All patient questions answered. Patient voiced understanding. I have reviewed this patient's history, habits, and medication list and have updated the chart where appropriate.

## 2022-03-17 NOTE — LETTER
[facility]  [address] [place] [id number]  Phone: [phone]  Fax: [phone]    ALBERTA Terry CNP        March 17, 2022     Patient: Bessie Carlton   YOB: 2000   Date of Visit: 3/17/2022       To Whom it May Concern:    Bessie Carlton was seen in my clinic on 3/17/2022. She may return to work on 3/18/22. If you have any questions or concerns, please don't hesitate to call.     Sincerely,         ALBERTA Terry CNP

## 2022-05-13 ENCOUNTER — OFFICE VISIT (OUTPATIENT)
Dept: FAMILY MEDICINE CLINIC | Age: 22
End: 2022-05-13
Payer: COMMERCIAL

## 2022-05-13 VITALS
SYSTOLIC BLOOD PRESSURE: 118 MMHG | HEART RATE: 95 BPM | OXYGEN SATURATION: 98 % | RESPIRATION RATE: 16 BRPM | TEMPERATURE: 98.4 F | DIASTOLIC BLOOD PRESSURE: 82 MMHG | HEIGHT: 65 IN | BODY MASS INDEX: 30.16 KG/M2 | WEIGHT: 181 LBS

## 2022-05-13 DIAGNOSIS — F39 MOOD DISORDER (HCC): Primary | ICD-10-CM

## 2022-05-13 DIAGNOSIS — F33.2 SEVERE EPISODE OF RECURRENT MAJOR DEPRESSIVE DISORDER, WITHOUT PSYCHOTIC FEATURES (HCC): ICD-10-CM

## 2022-05-13 DIAGNOSIS — F41.1 GENERALIZED ANXIETY DISORDER: ICD-10-CM

## 2022-05-13 DIAGNOSIS — Z30.016 ENCOUNTER FOR INITIAL PRESCRIPTION OF TRANSDERMAL PATCH HORMONAL CONTRACEPTIVE DEVICE: ICD-10-CM

## 2022-05-13 DIAGNOSIS — F90.0 ATTENTION DEFICIT HYPERACTIVITY DISORDER (ADHD), PREDOMINANTLY INATTENTIVE TYPE: ICD-10-CM

## 2022-05-13 PROCEDURE — 99214 OFFICE O/P EST MOD 30 MIN: CPT | Performed by: NURSE PRACTITIONER

## 2022-05-13 RX ORDER — LAMOTRIGINE 150 MG/1
150 TABLET ORAL DAILY
Qty: 30 TABLET | Refills: 2 | Status: SHIPPED | OUTPATIENT
Start: 2022-05-13 | End: 2022-09-08 | Stop reason: SDUPTHER

## 2022-05-13 RX ORDER — NORELGESTROMIN AND ETHINYL ESTRADIOL 150; 35 UG/D; UG/D
1 PATCH TRANSDERMAL
Qty: 4 PATCH | Refills: 5 | Status: SHIPPED | OUTPATIENT
Start: 2022-05-13 | End: 2022-10-07 | Stop reason: SDUPTHER

## 2022-05-13 NOTE — PROGRESS NOTES
Chief Complaint   Patient presents with    Follow-up     patient is here for 2 month f/u       History obtained from chart review and the patient. SUBJECTIVE:  Edin Cano is a 24 y.o. female that presents today for anxiety and depression     Anxiety/MDD  Taking 100 mg Lamictal and tolerating it fine. Had a horrible month of April being very depressed. She had a situation with her best friend since 3rd grade in which they are no longer friends and this really affected her moods, made her feel depressed. She is doing better now For the last couple of weeks. Feeling depressed depending upon life circumstances around her. She was having some suicidal thoughts last week, but nothing currently. Denies any plan. She did relapse a couple weeks ago with cutting. She has not done anything recent. Wonders what she should do in the future if she starts to feel the urge to self harm    Mood swings are improved. She will have some mood swings depending upon the life situations/triggers. Before she would have moods swings randomly. Anxiety is manageable. She does not have an appt with psychiatry. Birth Control    Currently sexually active? Yes  Current number of sexual partners? 1  Requesting specific BC?  yes - [place]  Current contraception -   History of sexually transmitted disease?  no Treated? no  LMP? No LMP recorded. Menarche:  15 y/o  Menses is irregular, lasting 7 days, flow is heavy, there is severe dysmenorrhea.     Hx of Blood Clots?  no  Current Smoker?  no  History of Headaches?  no    General ROS: negative for - chills, fever, hot flashes, night sweats, changes in weight or sleep disturbance  Genito-Urinary ROS: no dysuria, trouble voiding, hematuria, abnormal uterine bleeding or vaginal discharge      Age/Gender Health Maintenance    Lipid - 35  DM Screen - 35  Colon Cancer Screening - 50  Lung Cancer Screening (Age 54 to [de-identified] with 30 pack year hx, current smoker or quit within past 15 years) - n/a    Tetanus - needs  Influenza Vaccine - 10/18  Pneumonia Vaccine - 65  Zostavax - 50   HPV Vaccine - needs    Breast Cancer Screening - 50  Cervical Cancer Screening - 21  Osteoporosis Screening - 72  Chlamydia Screen - 16    PSA testing discussion - n/a  AAA Screening - n/a    Falls screening - n/a    Current Outpatient Medications   Medication Sig Dispense Refill    lamoTRIgine (LAMICTAL) 150 MG tablet Take 1 tablet by mouth daily 30 tablet 2    XULANE 150-35 MCG/24HR Place 1 patch onto the skin every 7 days 4 patch 5    diphenhydrAMINE (BENADRYL ALLERGY) 25 MG capsule Take 25 mg by mouth every 6 hours as needed for Itching      NONFORMULARY Birth control       ibuprofen (ADVIL;MOTRIN) 200 MG tablet Take 200 mg by mouth every 6 hours as needed for Pain      Naproxen Sodium (ALEVE PO) Take by mouth as needed       No current facility-administered medications for this visit. Orders Placed This Encounter   Medications    lamoTRIgine (LAMICTAL) 150 MG tablet     Sig: Take 1 tablet by mouth daily     Dispense:  30 tablet     Refill:  2    XULANE 150-35 MCG/24HR     Sig: Place 1 patch onto the skin every 7 days     Dispense:  4 patch     Refill:  5         All medications reviewed and reconciled, including OTC and herbal medications. Updated list given to patient. Patient Active Problem List   Diagnosis    Major depressive disorder, recurrent episode, moderate (HCC)    Attention deficit hyperactivity disorder (ADHD), predominantly inattentive type    Generalized anxiety disorder    Mood disorder ([facility] [address]       Past Medical History:   Diagnosis Date    ADHD (attention deficit hyperactivity disorder)     Anxiety     Attention deficit hyperactivity disorder (ADHD), predominantly inattentive type 1/16/2018    Blood in stool     Convergent comitant strabismus 2014    Diarrhea     PCOS (polycystic ovarian syndrome)        History reviewed. No pertinent surgical history.     No Known Allergies    Social History     Socioeconomic History    Marital status: Single     Spouse name: Not on file    Number of children: Not on file    Years of education: Not on file    Highest education level: Not on file   Occupational History    Not on file   Tobacco Use    Smoking status: Never Smoker    Smokeless tobacco: Never Used   Vaping Use    Vaping Use: Every day    Substances: Nicotine, Flavoring    Devices: Pre-filled or refillable cartridge, Refillable tank   Substance and Sexual Activity    Alcohol use: No    Drug use: No    Sexual activity: Yes     Partners: Male     Comment: uses condoms   Other Topics Concern    Not on file   Social History Narrative    Not on file     Social Determinants of Health     Financial Resource Strain: Medium Risk    Difficulty of Paying Living Expenses: Somewhat hard   Food Insecurity: Food Insecurity Present    Worried About Running Out of Food in the Last Year: Sometimes true    Diana of Food in the Last Year: Sometimes true   Transportation Needs:     Lack of Transportation (Medical): Not on file    Lack of Transportation (Non-Medical):  Not on file   Physical Activity:     Days of Exercise per Week: Not on file    Minutes of Exercise per Session: Not on file   Stress:     Feeling of Stress : Not on file   Social Connections:     Frequency of Communication with Friends and Family: Not on file    Frequency of Social Gatherings with Friends and Family: Not on file    Attends Denominational Services: Not on file    Active Member of Clubs or Organizations: Not on file    Attends Club or Organization Meetings: Not on file    Marital Status: Not on file   Intimate Partner Violence:     Fear of Current or Ex-Partner: Not on file    Emotionally Abused: Not on file    Physically Abused: Not on file    Sexually Abused: Not on file   Housing Stability:     Unable to Pay for Housing in the Last Year: Not on file    Number of [place] in the Last Year: Not on file    Unstable Housing in the Last Year: Not on file       Family History   Problem Relation Age of Onset    Depression Mother     No Known Problems Father     Allergy (Severe) Maternal Grandmother     Other Maternal Grandmother         fibromyalgia    Depression Maternal Grandmother     Cancer Paternal Grandmother         Pancreatic     Colon Cancer Neg Hx     Colon Polyps Neg Hx          I have reviewed the patient's past medical history, past surgical history, allergies, medications, social and family history and I have made updates where appropriate.       Review of Systems  Positive responses are highlighted in bold    Constitutional:  Fever, Chills, Night Sweats, Fatigue, Unexpected changes in weight  Eyes:  Eye discharge, Eye pain, Eye redness, Visual disturbances   HENT:  Ear pain, Tinnitus, Nosebleeds, Trouble swallowing, Hearing loss, Sore throat  Cardiovascular:  Chest Pain, Palpitations, Orthopnea, Paroxysmal Nocturnal Dyspnea  Respiratory:  Cough, Wheezing, Shortness of breath, Chest tightness, Apnea  Gastrointestinal:  Nausea, Vomiting, Diarrhea, Constipation, Heartburn, Blood in stool  Genitourinary:  Difficulty or painful urination, Flank pain, Change in frequency, Urgency  Skin:  Color change, Rash, Itching, Wound  Psychiatric:  Hallucinations, Anxiety, Depression, Suicidal ideation  Hematological:  Enlarged glands, Easy bleeding, Easily bruising  Musculoskeletal:  Joint pain, Back pain, Gait problems, Joint swelling, Myalgias  Neurological:  Dizziness, Headaches, Presyncope, Numbness, Seizures, Tremors  Allergy:  Environmental allergies, Food allergies  Endocrine:  Heat Intolerance, Cold Intolerance, Polydipsia, Polyphagia, Polyuria      PHYSICAL EXAM:  Vitals:    05/13/22 1101   BP: 118/82   Site: Right Upper Arm   Position: Sitting   Pulse: 95   Resp: 16   Temp: 98.4 °F (36.9 °C)   TempSrc: Infrared   SpO2: 98%   Weight: 181 lb (82.1 kg)   Height: 5' 5\" (1.651 m)     Body mass index is 30.12 kg/m². VS Reviewed  General Appearance: A&O x 3, No acute distress,well developed and well- nourished  Head: normocephalic and atraumatic  Eyes: pupils equal, round, and reactive to light, extraocular eye movements intact, conjunctivae and eye lids without erythema  Neck: supple and non-tender without mass, no thyromegaly or thyroid nodules, no cervical lymphadenopathy  Pulmonary/Chest: clear to auscultation bilaterally- no wheezes, rales or rhonchi, normal air movement, no respiratory distress or retractions  Cardiovascular: S1 and S2 auscultated w/ RRR. No murmurs, rubs, clicks, or gallops, distal pulses intact. Abdomen: soft, non-tender, non-distended, bowl sounds physiologic,  no rebound or guarding, no masses or hernias noted. Liver and spleen without enlargement. Extremities: no cyanosis, clubbing or edema of the lower extremities  Musculoskeletal: No joint swelling or gross deformity   Neuro:  Alert, 5/5 strength globally and symmetrically  Psych: Affect and mood are flat and depressed. Thought process is normal without evidence of psychosis. Good insight and appropriate interaction. Cognition and memory appear to be intact. Skin: warm and dry, no rash or erythema  Lymph:  No cervical, auricular or supraclavicular lymph nodes palpated    ASSESSMENT & PLAN  Salty Castillo was seen today for follow-up. Diagnoses and all orders for this visit:    Mood disorder ([facility] [address]  -     External Referral To Psychiatry  -     lamoTRIgine (LAMICTAL) 150 MG tablet; Take 1 tablet by mouth daily    Severe episode of recurrent major depressive disorder, without psychotic features ([address]  -     External Referral To Psychiatry  -     lamoTRIgine (LAMICTAL) 150 MG tablet;  Take 1 tablet by mouth daily    Attention deficit hyperactivity disorder (ADHD), predominantly inattentive type  -     External Referral To Psychiatry    Generalized anxiety disorder  -     External Referral To Psychiatry    Encounter for initial prescription of transdermal patch hormonal contraceptive device  -     XULANE 150-35 MCG/24HR; Place 1 patch onto the skin every 7 days  -     Cancel: POCT urine pregnancy  -     Pregnancy, Urine; Future      - increase Lamictal to 150 mg  - still needs to get lamotrigine level done  - refer again to psychiatry  - stressed importance of going to  or [facility] if she starts having thoughts of self harm  - anxiety stable and manageable  - med refill of Xulane, obtain urine pregn      DISPOSITION    Return in about 6 weeks (around 6/24/2022) for anxiety and depression. Susan released without restrictions. PATIENT COUNSELING    Counseling was provided today regarding the following topics: Healthy eating habits, Regular exercise, substance abuse and healthy sleep habits. Susan received counseling on the following healthy behaviors: nutrition    Patient given educational materials on: See Attached    I have instructed Susan to complete a self tracking handout on none and instructed them to bring it with them to her next appointment. Barriers to learning and self management: none    Discussed use, benefit, and side effects of prescribed medications. Barriers to medication compliance addressed. All patient questions answered. Pt voiced understanding.        Electronically signed by ALBERTA Sutton CNP on 5/13/2022 at 11:34 AM

## 2022-09-08 ENCOUNTER — OFFICE VISIT (OUTPATIENT)
Dept: FAMILY MEDICINE CLINIC | Age: 22
End: 2022-09-08
Payer: COMMERCIAL

## 2022-09-08 VITALS
RESPIRATION RATE: 16 BRPM | HEIGHT: 65 IN | DIASTOLIC BLOOD PRESSURE: 80 MMHG | HEART RATE: 83 BPM | OXYGEN SATURATION: 98 % | TEMPERATURE: 98.5 F | BODY MASS INDEX: 27.99 KG/M2 | WEIGHT: 168 LBS | SYSTOLIC BLOOD PRESSURE: 122 MMHG

## 2022-09-08 DIAGNOSIS — B35.9 TINEA: ICD-10-CM

## 2022-09-08 DIAGNOSIS — F31.4 BIPOLAR DISORDER, CURRENT EPISODE DEPRESSED, SEVERE, WITHOUT PSYCHOTIC FEATURES (HCC): Primary | ICD-10-CM

## 2022-09-08 PROBLEM — F31.9 AFFECTIVE PSYCHOSIS, BIPOLAR (HCC): Status: ACTIVE | Noted: 2021-02-02

## 2022-09-08 PROCEDURE — 99214 OFFICE O/P EST MOD 30 MIN: CPT | Performed by: NURSE PRACTITIONER

## 2022-09-08 RX ORDER — CLOTRIMAZOLE 1 %
CREAM (GRAM) TOPICAL
Qty: 45 G | Refills: 1 | Status: SHIPPED | OUTPATIENT
Start: 2022-09-08 | End: 2022-09-15

## 2022-09-08 RX ORDER — LAMOTRIGINE 150 MG/1
150 TABLET ORAL DAILY
Qty: 90 TABLET | Refills: 0 | Status: SHIPPED | OUTPATIENT
Start: 2022-09-08

## 2022-09-08 RX ORDER — TERBINAFINE HYDROCHLORIDE 250 MG/1
250 TABLET ORAL DAILY
Qty: 14 TABLET | Refills: 0 | Status: SHIPPED | OUTPATIENT
Start: 2022-09-08 | End: 2022-09-22

## 2022-09-08 RX ORDER — QUETIAPINE FUMARATE 25 MG/1
TABLET, FILM COATED ORAL
Qty: 60 TABLET | Refills: 2 | Status: SHIPPED | OUTPATIENT
Start: 2022-09-08 | End: 2022-10-07

## 2022-09-08 SDOH — ECONOMIC STABILITY: FOOD INSECURITY: WITHIN THE PAST 12 MONTHS, THE FOOD YOU BOUGHT JUST DIDN'T LAST AND YOU DIDN'T HAVE MONEY TO GET MORE.: NEVER TRUE

## 2022-09-08 SDOH — ECONOMIC STABILITY: FOOD INSECURITY: WITHIN THE PAST 12 MONTHS, YOU WORRIED THAT YOUR FOOD WOULD RUN OUT BEFORE YOU GOT MONEY TO BUY MORE.: NEVER TRUE

## 2022-09-08 ASSESSMENT — SOCIAL DETERMINANTS OF HEALTH (SDOH): HOW HARD IS IT FOR YOU TO PAY FOR THE VERY BASICS LIKE FOOD, HOUSING, MEDICAL CARE, AND HEATING?: NOT VERY HARD

## 2022-09-08 NOTE — PROGRESS NOTES
Chief Complaint   Patient presents with    Follow-up     Med follow up, rash left wrist mainly and couple other spots        History obtained from chart review and the patient. SUBJECTIVE:  Tod Duvall is a 24 y.o. female that presents today for anxiety and depression     Anxiety/MDD  Taking 150 mg Lamictal and doing decently. She does note some hair loss, wonders if it's because of the Lamictal. Still having random mood swings, and struggling with depression. The mood swings are better though, not happening as rapidly as they were. Feeling depressed probably 4-5 days/week. Still struggling with suicidal thoughts, maybe once every couple of week. Denies any plan. Sleep is very poor. Struggles falling asleep. She is getting about 4-6 hours sleep/night. Anxiety is manageable. She did not go to see psychiatry because of finances. Dropped out of school over the summer. 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 Rash    HPI:    Length of time Sx have been present - several months. Rash has gotten unchanged since initially starting  Affected areas - arms and legs  Inciting events or exposures prior to rash starting? No  Pruritic? Yes  Erythematous? Yes  Weeping or drainage? No  History of Urticaria? No  Fever? No  Painful? No    Review of Systems - General ROS: negative for - chills, fever or night sweats  Respiratory ROS: negative for - shortness of breath, stridor or wheezing    Tried the OTC medications for a couple weeks.  Tried Cortisone first and then another antifungal.    Age/Gender Health Maintenance    Lipid - 35  DM Screen - 35  Colon Cancer Screening - 48  Lung Cancer Screening (Age 54 to [de-identified] with 30 pack year hx, current smoker or quit within past 15 years) - n/a    Tetanus - needs  Influenza Vaccine - 10/18  Pneumonia Vaccine - 65  Zostavax - 50   HPV Vaccine - needs    Breast Cancer Screening - 50  Cervical Cancer Screening - 21  Osteoporosis Screening - 65  Chlamydia Screen - 16    PSA testing discussion - n/a  AAA Screening - n/a    Falls screening - n/a    Current Outpatient Medications   Medication Sig Dispense Refill    QUEtiapine (SEROQUEL) 25 MG tablet Take 1 tablet by mouth nightly for 1 week, then take 1 tablet by mouth twice a day. 60 tablet 2    lamoTRIgine (LAMICTAL) 150 MG tablet Take 1 tablet by mouth daily 90 tablet 0    clotrimazole (LOTRIMIN AF) 1 % cream Apply topically 2 times daily. 45 g 1    terbinafine (LAMISIL) 250 MG tablet Take 1 tablet by mouth daily for 14 days 14 tablet 0    XULANE 150-35 MCG/24HR Place 1 patch onto the skin every 7 days 4 patch 5    diphenhydrAMINE (BENADRYL) 25 MG capsule Take 25 mg by mouth every 6 hours as needed for Itching      NONFORMULARY Birth control       ibuprofen (ADVIL;MOTRIN) 200 MG tablet Take 200 mg by mouth every 6 hours as needed for Pain      Naproxen Sodium (ALEVE PO) Take by mouth as needed       No current facility-administered medications for this visit. Orders Placed This Encounter   Medications    QUEtiapine (SEROQUEL) 25 MG tablet     Sig: Take 1 tablet by mouth nightly for 1 week, then take 1 tablet by mouth twice a day. Dispense:  60 tablet     Refill:  2    lamoTRIgine (LAMICTAL) 150 MG tablet     Sig: Take 1 tablet by mouth daily     Dispense:  90 tablet     Refill:  0    clotrimazole (LOTRIMIN AF) 1 % cream     Sig: Apply topically 2 times daily. Dispense:  45 g     Refill:  1    terbinafine (LAMISIL) 250 MG tablet     Sig: Take 1 tablet by mouth daily for 14 days     Dispense:  14 tablet     Refill:  0           All medications reviewed and reconciled, including OTC and herbal medications. Updated list given to patient.        Patient Active Problem List   Diagnosis    Major depressive disorder, recurrent episode, moderate (HCC)    Attention deficit hyperactivity disorder (ADHD), predominantly inattentive type    Generalized anxiety disorder    Affective psychosis, bipolar ([facility] [address]       Past Medical History:   Diagnosis Date    ADHD (attention deficit hyperactivity disorder)     Anxiety     Attention deficit hyperactivity disorder (ADHD), predominantly inattentive type 1/16/2018    Blood in stool     Convergent comitant strabismus 2014    Diarrhea     PCOS (polycystic ovarian syndrome)        No past surgical history on file.     No Known Allergies    Social History     Socioeconomic History    Marital status: Single     Spouse name: Not on file    Number of children: Not on file    Years of education: Not on file    Highest education level: Not on file   Occupational History    Not on file   Tobacco Use    Smoking status: Never    Smokeless tobacco: Never   Vaping Use    Vaping Use: Every day    Substances: Nicotine, Flavoring    Devices: Pre-filled or refillable cartridge, Refillable tank   Substance and Sexual Activity    Alcohol use: No    Drug use: No    Sexual activity: Yes     Partners: Male     Comment: uses condoms   Other Topics Concern    Not on file   Social History Narrative    Not on file     Social Determinants of Health     Financial Resource Strain: Low Risk     Difficulty of Paying Living Expenses: Not very hard   Food Insecurity: No Food Insecurity    Worried About Running Out of Food in the Last Year: Never true    Ran Out of Food in the Last Year: Never true   Transportation Needs: Not on file   Physical Activity: Not on file   Stress: Not on file   Social Connections: Not on file   Intimate Partner Violence: Not on file   Housing Stability: Not on file       Family History   Problem Relation Age of Onset    Depression Mother     No Known Problems Father     Allergy (Severe) Maternal Grandmother     Other Maternal Grandmother         fibromyalgia    Depression Maternal Grandmother     Cancer Paternal Grandmother         Pancreatic     Colon Cancer Neg Hx     Colon Polyps Neg Hx          I have reviewed the patient's past medical history, past surgical history, allergies, medications, social and family history and I have made updates where appropriate. Review of Systems  Positive responses are highlighted in bold    Constitutional:  Fever, Chills, Night Sweats, Fatigue, Unexpected changes in weight  Eyes:  Eye discharge, Eye pain, Eye redness, Visual disturbances   HENT:  Ear pain, Tinnitus, Nosebleeds, Trouble swallowing, Hearing loss, Sore throat  Cardiovascular:  Chest Pain, Palpitations, Orthopnea, Paroxysmal Nocturnal Dyspnea  Respiratory:  Cough, Wheezing, Shortness of breath, Chest tightness, Apnea  Gastrointestinal:  Nausea, Vomiting, Diarrhea, Constipation, Heartburn, Blood in stool  Genitourinary:  Difficulty or painful urination, Flank pain, Change in frequency, Urgency  Skin:  Color change, Rash, Itching, Wound  Psychiatric:  Hallucinations, Anxiety, Depression, Suicidal ideation  Hematological:  Enlarged glands, Easy bleeding, Easily bruising  Musculoskeletal:  Joint pain, Back pain, Gait problems, Joint swelling, Myalgias  Neurological:  Dizziness, Headaches, Presyncope, Numbness, Seizures, Tremors  Allergy:  Environmental allergies, Food allergies  Endocrine:  Heat Intolerance, Cold Intolerance, Polydipsia, Polyphagia, Polyuria      PHYSICAL EXAM:  Vitals:    09/08/22 0946   BP: 122/80   Site: Right Upper Arm   Position: Sitting   Cuff Size: Large Adult   Pulse: 83   Resp: 16   Temp: 98.5 °F (36.9 °C)   TempSrc: Oral   SpO2: 98%   Weight: 168 lb (76.2 kg)   Height: 5' 5\" (1.651 m)     Body mass index is 27.96 kg/m².          VS Reviewed  General Appearance: A&O x 3, No acute distress,well developed and well- nourished  Head: normocephalic and atraumatic  Eyes: pupils equal, round, and reactive to light, extraocular eye movements intact, conjunctivae and eye lids without erythema  Neck: supple and non-tender without mass, no thyromegaly or thyroid nodules, no cervical lymphadenopathy  Pulmonary/Chest: clear to auscultation bilaterally- no wheezes, rales or rhonchi, normal air movement, no respiratory distress or retractions  Cardiovascular: S1 and S2 auscultated w/ RRR. No murmurs, rubs, clicks, or gallops, distal pulses intact. Abdomen: soft, non-tender, non-distended, bowl sounds physiologic,  no rebound or guarding, no masses or hernias noted. Liver and spleen without enlargement. Extremities: no cyanosis, clubbing or edema of the lower extremities  Musculoskeletal: No joint swelling or gross deformity   Neuro:  Alert, 5/5 strength globally and symmetrically  Psych: Affect and mood are flat and depressed. Thought process is normal without evidence of psychosis. Good insight and appropriate interaction. Cognition and memory appear to be intact. Skin: warm and dry, macular circular erythematous lesions left wrist, right upper arm, lower leg (R)  Lymph:  No cervical, auricular or supraclavicular lymph nodes palpated    ASSESSMENT & PLAN  Josemanuel Abdalla was seen today for follow-up. Diagnoses and all orders for this visit:    Bipolar disorder, current episode depressed, severe, without psychotic features ([facility] [address]  -     QUEtiapine (SEROQUEL) 25 MG tablet; Take 1 tablet by mouth nightly for 1 week, then take 1 tablet by mouth twice a day. -     lamoTRIgine (LAMICTAL) 150 MG tablet; Take 1 tablet by mouth daily    Tinea  -     clotrimazole (LOTRIMIN AF) 1 % cream; Apply topically 2 times daily. -     terbinafine (LAMISIL) 250 MG tablet; Take 1 tablet by mouth daily for 14 days    - really needs higher level of psychiatric care. Has been referred multiple times and she hasn't followed up, mostly due to finances. - rec'd she go to [facility]  - con't Lamictal 150 mg  - needs to obtain Lamotrigine level, order reprinted and given to patient  - add Seroquel  - start topical Lotrimin and Lamisil    DISPOSITION    Return in about 4 weeks (around 10/6/2022) for bipolar disorder. Susan released without restrictions.       PATIENT COUNSELING    Counseling was provided today regarding the following topics: Healthy eating habits, Regular exercise, substance abuse and healthy sleep habits. Susan received counseling on the following healthy behaviors: nutrition    Patient given educational materials on: See Attached    I have instructed Susan to complete a self tracking handout on none and instructed them to bring it with them to her next appointment. Barriers to learning and self management: none    Discussed use, benefit, and side effects of prescribed medications. Barriers to medication compliance addressed. All patient questions answered. Pt voiced understanding.        Electronically signed by ALBERTA Stone CNP on 9/8/2022 at 10:10 AM

## 2022-10-07 ENCOUNTER — OFFICE VISIT (OUTPATIENT)
Dept: FAMILY MEDICINE CLINIC | Age: 22
End: 2022-10-07
Payer: COMMERCIAL

## 2022-10-07 VITALS
BODY MASS INDEX: 30.85 KG/M2 | DIASTOLIC BLOOD PRESSURE: 60 MMHG | RESPIRATION RATE: 16 BRPM | HEART RATE: 98 BPM | TEMPERATURE: 97.6 F | HEIGHT: 61 IN | SYSTOLIC BLOOD PRESSURE: 120 MMHG | WEIGHT: 163.4 LBS | OXYGEN SATURATION: 98 %

## 2022-10-07 DIAGNOSIS — M65.4 DE QUERVAIN'S TENOSYNOVITIS, RIGHT: ICD-10-CM

## 2022-10-07 DIAGNOSIS — Z30.016 ENCOUNTER FOR INITIAL PRESCRIPTION OF TRANSDERMAL PATCH HORMONAL CONTRACEPTIVE DEVICE: Primary | ICD-10-CM

## 2022-10-07 DIAGNOSIS — F31.4 BIPOLAR DISORDER, CURRENT EPISODE DEPRESSED, SEVERE, WITHOUT PSYCHOTIC FEATURES (HCC): ICD-10-CM

## 2022-10-07 DIAGNOSIS — F41.1 GENERALIZED ANXIETY DISORDER: ICD-10-CM

## 2022-10-07 LAB
CONTROL: NORMAL
PREGNANCY TEST URINE, POC: NEGATIVE

## 2022-10-07 PROCEDURE — 99214 OFFICE O/P EST MOD 30 MIN: CPT | Performed by: NURSE PRACTITIONER

## 2022-10-07 PROCEDURE — 81025 URINE PREGNANCY TEST: CPT | Performed by: NURSE PRACTITIONER

## 2022-10-07 RX ORDER — NORELGESTROMIN AND ETHINYL ESTRADIOL 150; 35 UG/D; UG/D
1 PATCH TRANSDERMAL
Qty: 4 PATCH | Refills: 11 | Status: SHIPPED | OUTPATIENT
Start: 2022-10-07

## 2022-10-07 NOTE — PROGRESS NOTES
Chief Complaint   Patient presents with    Follow-up     Patient states she stopped medication/it made her mental health worse. History obtained from chart review and the patient. SUBJECTIVE:  Ilsa Mercedes is a 24 y.o. female that presents today for anxiety and depression     Anxiety/MDD    Tried the Seroquel and reacted horribly to it. It made her anxiety and depression even worse, had worsening suicidal thoughts, etc. She stopped it and has been off of it for several weeks. She is feeling better since she stopped it. Taking 150 mg Lamictal and doing decently. She does note some hair loss, wonders if it's because of the Lamictal. Still having random mood swings, and struggling with depression. The mood swings are better though, not happening as rapidly as they were. Feeling depressed probably 2 days/week. Still struggling with suicidal thoughts, maybe once every couple of week. Denies any plan. Sleep is very poor. Struggles falling asleep. She is getting about 4-6 hours sleep/night. Anxiety is manageable. She did not go to see psychiatry because of finances. Dropped out of school over the summer. 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 C/o right wrist pain for a couple months. Pain is better in the AM, worse throughout the day. Tylenol/motrin do not help with the pain. Denies any decrease in strength, denies any paresthesias. Pain is mostly in the thumb    Would also like to get her birth control refilled. She is sexually active with single male partner. She does have OBGYN. Denies any vaginal symptoms.     Age/Gender Health Maintenance    Lipid - 35  DM Screen - 35  Colon Cancer Screening - 48  Lung Cancer Screening (Age 54 to [de-identified] with 30 pack year hx, current smoker or quit within past 15 years) - n/a    Tetanus - needs  Influenza Vaccine - 10/18  Pneumonia Vaccine - 65  Zostavax - 50   HPV Vaccine - needs    Breast Cancer Screening - 50  Cervical Cancer Screening - 21  Osteoporosis Screening - 72  Chlamydia Screen - 16    PSA testing discussion - n/a  AAA Screening - n/a    Falls screening - n/a    Current Outpatient Medications   Medication Sig Dispense Refill    XULANE 150-35 MCG/24HR Place 1 patch onto the skin every 7 days 4 patch 11    lamoTRIgine (LAMICTAL) 150 MG tablet Take 1 tablet by mouth daily 90 tablet 0    diphenhydrAMINE (BENADRYL) 25 MG capsule Take 25 mg by mouth every 6 hours as needed for Itching      NONFORMULARY Birth control       ibuprofen (ADVIL;MOTRIN) 200 MG tablet Take 200 mg by mouth every 6 hours as needed for Pain      Naproxen Sodium (ALEVE PO) Take by mouth as needed       No current facility-administered medications for this visit. Orders Placed This Encounter   Medications    Marsa Pac 150-35 MCG/24HR     Sig: Place 1 patch onto the skin every 7 days     Dispense:  4 patch     Refill:  11             All medications reviewed and reconciled, including OTC and herbal medications. Updated list given to patient. Patient Active Problem List   Diagnosis    Major depressive disorder, recurrent episode, moderate (HCC)    Attention deficit hyperactivity disorder (ADHD), predominantly inattentive type    Generalized anxiety disorder    Affective psychosis, bipolar ([facility] [address]       Past Medical History:   Diagnosis Date    ADHD (attention deficit hyperactivity disorder)     Anxiety     Attention deficit hyperactivity disorder (ADHD), predominantly inattentive type 1/16/2018    Blood in stool     Convergent comitant strabismus 2014    Diarrhea     PCOS (polycystic ovarian syndrome)        No past surgical history on file.     No Known Allergies    Social History     Socioeconomic History    Marital status: Single     Spouse name: Not on file    Number of children: Not on file    Years of education: Not on file    Highest education level: Not on file   Occupational History    Not on file   Tobacco Use    Smoking status: Never    Smokeless tobacco: Never   Vaping Use Vaping Use: Every day    Substances: Nicotine, Flavoring    Devices: Pre-filled or refillable cartridge, Refillable tank   Substance and Sexual Activity    Alcohol use: No    Drug use: No    Sexual activity: Yes     Partners: Male     Comment: uses condoms   Other Topics Concern    Not on file   Social History Narrative    Not on file     Social Determinants of Health     Financial Resource Strain: Low Risk     Difficulty of Paying Living Expenses: Not very hard   Food Insecurity: No Food Insecurity    Worried About Running Out of Food in the Last Year: Never true    Ran Out of Food in the Last Year: Never true   Transportation Needs: Not on file   Physical Activity: Not on file   Stress: Not on file   Social Connections: Not on file   Intimate Partner Violence: Not on file   Housing Stability: Not on file       Family History   Problem Relation Age of Onset    Depression Mother     No Known Problems Father     Allergy (Severe) Maternal Grandmother     Other Maternal Grandmother         fibromyalgia    Depression Maternal Grandmother     Cancer Paternal Grandmother         Pancreatic     Colon Cancer Neg Hx     Colon Polyps Neg Hx          I have reviewed the patient's past medical history, past surgical history, allergies, medications, social and family history and I have made updates where appropriate.       Review of Systems  Positive responses are highlighted in bold    Constitutional:  Fever, Chills, Night Sweats, Fatigue, Unexpected changes in weight  Eyes:  Eye discharge, Eye pain, Eye redness, Visual disturbances   HENT:  Ear pain, Tinnitus, Nosebleeds, Trouble swallowing, Hearing loss, Sore throat  Cardiovascular:  Chest Pain, Palpitations, Orthopnea, Paroxysmal Nocturnal Dyspnea  Respiratory:  Cough, Wheezing, Shortness of breath, Chest tightness, Apnea  Gastrointestinal:  Nausea, Vomiting, Diarrhea, Constipation, Heartburn, Blood in stool  Genitourinary:  Difficulty or painful urination, Flank pain, Change in frequency, Urgency  Skin:  Color change, Rash, Itching, Wound  Psychiatric:  Hallucinations, Anxiety, Depression, Suicidal ideation  Hematological:  Enlarged glands, Easy bleeding, Easily bruising  Musculoskeletal:  Joint pain, Back pain, Gait problems, Joint swelling, Myalgias  Neurological:  Dizziness, Headaches, Presyncope, Numbness, Seizures, Tremors  Allergy:  Environmental allergies, Food allergies  Endocrine:  Heat Intolerance, Cold Intolerance, Polydipsia, Polyphagia, Polyuria      PHYSICAL EXAM:  Vitals:    10/07/22 1104   BP: 120/60   Site: Right Upper Arm   Position: Sitting   Cuff Size: Large Adult   Pulse: 98   Resp: 16   Temp: 97.6 °F (36.4 °C)   SpO2: 98%   Weight: 163 lb 6.4 oz (74.1 kg)   Height: 5' 0.5\" (1.537 m)       Body mass index is 31.39 kg/m². VS Reviewed  General Appearance: A&O x 3, No acute distress,well developed and well- nourished  Head: normocephalic and atraumatic  Eyes: pupils equal, round, and reactive to light, extraocular eye movements intact, conjunctivae and eye lids without erythema  Neck: supple and non-tender without mass, no thyromegaly or thyroid nodules, no cervical lymphadenopathy  Pulmonary/Chest: clear to auscultation bilaterally- no wheezes, rales or rhonchi, normal air movement, no respiratory distress or retractions  Cardiovascular: S1 and S2 auscultated w/ RRR. No murmurs, rubs, clicks, or gallops, distal pulses intact. Abdomen: soft, non-tender, non-distended, bowl sounds physiologic,  no rebound or guarding, no masses or hernias noted. Liver and spleen without enlargement. Extremities: no cyanosis, clubbing or edema of the lower extremities  Musculoskeletal: No joint swelling or gross deformity   Right wrist:  + finkelstein's maneuver  Neuro:  Alert, 5/5 strength globally and symmetrically  Psych: Affect and mood are flat and depressed. Thought process is normal without evidence of psychosis. Good insight and appropriate interaction.   Cognition and memory appear to be intact. Skin: warm and dry, no rash  Lymph:  No cervical, auricular or supraclavicular lymph nodes palpated    ASSESSMENT & PLAN  Skylar Boogie was seen today for follow-up. Diagnoses and all orders for this visit:    Encounter for initial prescription of transdermal patch hormonal contraceptive device  -     Seb Caridad 150-35 MCG/24HR; Place 1 patch onto the skin every 7 days    Bipolar disorder, current episode depressed, severe, without psychotic features [facility])  -     External Referral To Psychiatry    Generalized anxiety disorder  -     External Referral To Psychiatry    De Quervain's tenosynovitis, right      - really needs higher level of psychiatric care. Will refer to Robin Dural with [facility]  - urine preg negative, refill sent of Xulane  - HEP for De Quervain's tenosynovitis  - con't Lamictal, I will let psychiatry manage meds from here on out    DISPOSITION    Return in about 2 months (around 12/7/2022) for anxiety and depression. Susan released without restrictions. PATIENT COUNSELING    Counseling was provided today regarding the following topics: Healthy eating habits, Regular exercise, substance abuse and healthy sleep habits. Susan received counseling on the following healthy behaviors: nutrition    Patient given educational materials on: See Attached    I have instructed Susan to complete a self tracking handout on none and instructed them to bring it with them to her next appointment. Barriers to learning and self management: none    Discussed use, benefit, and side effects of prescribed medications. Barriers to medication compliance addressed. All patient questions answered. Pt voiced understanding.        Electronically signed by ALBERTA Fink CNP on 10/7/2022 at 11:28 AM

## 2022-12-09 ENCOUNTER — OFFICE VISIT (OUTPATIENT)
Dept: FAMILY MEDICINE CLINIC | Age: 22
End: 2022-12-09
Payer: COMMERCIAL

## 2022-12-09 VITALS
HEART RATE: 103 BPM | OXYGEN SATURATION: 98 % | RESPIRATION RATE: 14 BRPM | SYSTOLIC BLOOD PRESSURE: 126 MMHG | DIASTOLIC BLOOD PRESSURE: 74 MMHG | BODY MASS INDEX: 30.09 KG/M2 | TEMPERATURE: 98.4 F | HEIGHT: 61 IN | WEIGHT: 159.4 LBS

## 2022-12-09 DIAGNOSIS — F31.4 BIPOLAR DISORDER, CURRENT EPISODE DEPRESSED, SEVERE, WITHOUT PSYCHOTIC FEATURES (HCC): Primary | ICD-10-CM

## 2022-12-09 DIAGNOSIS — F41.1 GENERALIZED ANXIETY DISORDER: ICD-10-CM

## 2022-12-09 PROCEDURE — 99214 OFFICE O/P EST MOD 30 MIN: CPT | Performed by: NURSE PRACTITIONER

## 2022-12-09 RX ORDER — DULOXETIN HYDROCHLORIDE 20 MG/1
20 CAPSULE, DELAYED RELEASE ORAL 2 TIMES DAILY
Qty: 60 CAPSULE | Refills: 1 | Status: SHIPPED | OUTPATIENT
Start: 2022-12-09

## 2022-12-09 RX ORDER — MIRTAZAPINE 7.5 MG/1
7.5 TABLET, FILM COATED ORAL NIGHTLY
Qty: 30 TABLET | Refills: 5 | Status: SHIPPED | OUTPATIENT
Start: 2022-12-09

## 2022-12-09 NOTE — PROGRESS NOTES
Chief Complaint   Patient presents with    Follow-up     Anxiety and depression are worse       History obtained from chart review and the patient. SUBJECTIVE:  Abraham Marr is a 25 y.o. female that presents today for anxiety and depression     Anxiety/MDD  Did not schedule appt with [facility] psychiatry. She reports that whenever she got motivation to schedule appt, it was around 2 AM, and then she would forget the next day. Taking 150 mg Lamictal and doing decently. She is struggling with a lot of depression daily. Can also depend though on situations. She does have a lot of mood swings though, can go from being really happy and upbeat, to feeling angry, to then feeling depressed. Has been having suicidal thoughts daily, denies any plan or intent. States that she would never do it. Sleep is very poor. Struggles falling asleep, will finally fall asleep around 6-7 AM.    Anxiety is a little worse.      She has tried Zoloft, Prozac, Paxil, Lexapro, Buspar, Wellbutrin, Zyprexa, Seroquel, Abilify in the past with minimal results      Age/Gender Health Maintenance    Lipid - 28  DM Screen - 35  Colon Cancer Screening - 48  Lung Cancer Screening (Age 54 to [de-identified] with 30 pack year hx, current smoker or quit within past 15 years) - n/a    Tetanus - needs  Influenza Vaccine - 10/18  Pneumonia Vaccine - 65  Zostavax - 50   HPV Vaccine - needs    Breast Cancer Screening - 50  Cervical Cancer Screening - 21  Osteoporosis Screening - 65  Chlamydia Screen - 16    PSA testing discussion - n/a  AAA Screening - n/a    Falls screening - n/a    Current Outpatient Medications   Medication Sig Dispense Refill    mirtazapine (REMERON) 7.5 MG tablet Take 1 tablet by mouth nightly 30 tablet 5    DULoxetine (CYMBALTA) 20 MG extended release capsule Take 1 capsule by mouth 2 times daily 60 capsule 1    XULANE 150-35 MCG/24HR Place 1 patch onto the skin every 7 days 4 patch 11    lamoTRIgine (LAMICTAL) 150 MG tablet Take 1 tablet by mouth daily 90 tablet 0    diphenhydrAMINE (BENADRYL) 25 MG capsule Take 25 mg by mouth every 6 hours as needed for Itching      NONFORMULARY Birth control       ibuprofen (ADVIL;MOTRIN) 200 MG tablet Take 200 mg by mouth every 6 hours as needed for Pain      Naproxen Sodium (ALEVE PO) Take by mouth as needed       No current facility-administered medications for this visit. Orders Placed This Encounter   Medications    mirtazapine (REMERON) 7.5 MG tablet     Sig: Take 1 tablet by mouth nightly     Dispense:  30 tablet     Refill:  5    DULoxetine (CYMBALTA) 20 MG extended release capsule     Sig: Take 1 capsule by mouth 2 times daily     Dispense:  60 capsule     Refill:  1               All medications reviewed and reconciled, including OTC and herbal medications. Updated list given to patient. Patient Active Problem List   Diagnosis    Major depressive disorder, recurrent episode, moderate (HCC)    Attention deficit hyperactivity disorder (ADHD), predominantly inattentive type    Generalized anxiety disorder    Affective psychosis, bipolar ([facility] [address]       Past Medical History:   Diagnosis Date    ADHD (attention deficit hyperactivity disorder)     Anxiety     Attention deficit hyperactivity disorder (ADHD), predominantly inattentive type 1/16/2018    Blood in stool     Convergent comitant strabismus 2014    Diarrhea     PCOS (polycystic ovarian syndrome)        History reviewed. No pertinent surgical history.     No Known Allergies    Social History     Socioeconomic History    Marital status: Single     Spouse name: Not on file    Number of children: Not on file    Years of education: Not on file    Highest education level: Not on file   Occupational History    Not on file   Tobacco Use    Smoking status: Never    Smokeless tobacco: Never   Vaping Use    Vaping Use: Every day    Substances: Nicotine, Flavoring    Devices: Pre-filled or refillable cartridge, Refillable tank   Substance and Sexual Activity Alcohol use: No    Drug use: No    Sexual activity: Yes     Partners: Male     Comment: uses condoms   Other Topics Concern    Not on file   Social History Narrative    Not on file     Social Determinants of Health     Financial Resource Strain: Low Risk     Difficulty of Paying Living Expenses: Not very hard   Food Insecurity: No Food Insecurity    Worried About Running Out of Food in the Last Year: Never true    Ran Out of Food in the Last Year: Never true   Transportation Needs: Not on file   Physical Activity: Not on file   Stress: Not on file   Social Connections: Not on file   Intimate Partner Violence: Not on file   Housing Stability: Not on file       Family History   Problem Relation Age of Onset    Depression Mother     No Known Problems Father     Allergy (Severe) Maternal Grandmother     Other Maternal Grandmother         fibromyalgia    Depression Maternal Grandmother     Cancer Paternal Grandmother         Pancreatic     Colon Cancer Neg Hx     Colon Polyps Neg Hx          I have reviewed the patient's past medical history, past surgical history, allergies, medications, social and family history and I have made updates where appropriate.       Review of Systems  Positive responses are highlighted in bold    Constitutional:  Fever, Chills, Night Sweats, Fatigue, Unexpected changes in weight  Eyes:  Eye discharge, Eye pain, Eye redness, Visual disturbances   HENT:  Ear pain, Tinnitus, Nosebleeds, Trouble swallowing, Hearing loss, Sore throat  Cardiovascular:  Chest Pain, Palpitations, Orthopnea, Paroxysmal Nocturnal Dyspnea  Respiratory:  Cough, Wheezing, Shortness of breath, Chest tightness, Apnea  Gastrointestinal:  Nausea, Vomiting, Diarrhea, Constipation, Heartburn, Blood in stool  Genitourinary:  Difficulty or painful urination, Flank pain, Change in frequency, Urgency  Skin:  Color change, Rash, Itching, Wound  Psychiatric:  Hallucinations, Anxiety, Depression, Suicidal ideation  Hematological: Enlarged glands, Easy bleeding, Easily bruising  Musculoskeletal:  Joint pain, Back pain, Gait problems, Joint swelling, Myalgias  Neurological:  Dizziness, Headaches, Presyncope, Numbness, Seizures, Tremors  Allergy:  Environmental allergies, Food allergies  Endocrine:  Heat Intolerance, Cold Intolerance, Polydipsia, Polyphagia, Polyuria      PHYSICAL EXAM:  Vitals:    12/09/22 1119   BP: 126/74   Pulse: (!) 103   Resp: 14   Temp: 98.4 °F (36.9 °C)   TempSrc: Oral   SpO2: 98%   Weight: 159 lb 6.4 oz (72.3 kg)   Height: 5' 0.5\" (1.537 m)       Body mass index is 30.62 kg/m². VS Reviewed  General Appearance: A&O x 3, No acute distress,well developed and well- nourished  Head: normocephalic and atraumatic  Eyes: pupils equal, round, and reactive to light, extraocular eye movements intact, conjunctivae and eye lids without erythema  Neck: supple and non-tender without mass, no thyromegaly or thyroid nodules, no cervical lymphadenopathy  Pulmonary/Chest: clear to auscultation bilaterally- no wheezes, rales or rhonchi, normal air movement, no respiratory distress or retractions  Cardiovascular: S1 and S2 auscultated w/ RRR. No murmurs, rubs, clicks, or gallops, distal pulses intact. Abdomen: soft, non-tender, non-distended, bowl sounds physiologic,  no rebound or guarding, no masses or hernias noted. Liver and spleen without enlargement. Extremities: no cyanosis, clubbing or edema of the lower extremities  Musculoskeletal: No joint swelling or gross deformity   Neuro:  Alert, 5/5 strength globally and symmetrically  Psych: Affect and mood are flat and depressed. Thought process is normal without evidence of psychosis. Good insight and appropriate interaction. Cognition and memory appear to be intact. Skin: warm and dry, no rash  Lymph:  No cervical, auricular or supraclavicular lymph nodes palpated    ASSESSMENT & PLAN  Neetu Garcia was seen today for follow-up.     Diagnoses and all orders for this visit:    Bipolar disorder, current episode depressed, severe, without psychotic features ([facility] [address]  -     mirtazapine (REMERON) 7.5 MG tablet; Take 1 tablet by mouth nightly  -     DULoxetine (CYMBALTA) 20 MG extended release capsule; Take 1 capsule by mouth 2 times daily    Generalized anxiety disorder  -     mirtazapine (REMERON) 7.5 MG tablet; Take 1 tablet by mouth nightly  -     DULoxetine (CYMBALTA) 20 MG extended release capsule; Take 1 capsule by mouth 2 times daily        - really needs higher level of psychiatric care, have referred multiple times to psychiatry but patient has never gone  - she does have the phone number for Dave Mian, she is going to work on scheduling  - struggling managing the depression, mostly because she has failed multiple different medications  - hasn't tried Cymbalta, so will trial this  - add Remeron also for sleep    DISPOSITION    Return in about 4 weeks (around 1/6/2023) for anxiety and depression. Susan released without restrictions. PATIENT COUNSELING    Counseling was provided today regarding the following topics: Healthy eating habits, Regular exercise, substance abuse and healthy sleep habits. Susan received counseling on the following healthy behaviors: nutrition    Patient given educational materials on: See Attached    I have instructed Susan to complete a self tracking handout on none and instructed them to bring it with them to her next appointment. Barriers to learning and self management: none    Discussed use, benefit, and side effects of prescribed medications. Barriers to medication compliance addressed. All patient questions answered. Pt voiced understanding.        Electronically signed by ALBERTA Carranza CNP on 12/9/2022 at 11:44 AM

## 2023-01-06 ENCOUNTER — OFFICE VISIT (OUTPATIENT)
Dept: FAMILY MEDICINE CLINIC | Age: 23
End: 2023-01-06
Payer: COMMERCIAL

## 2023-01-06 VITALS
HEIGHT: 65 IN | WEIGHT: 164.8 LBS | TEMPERATURE: 97.8 F | RESPIRATION RATE: 16 BRPM | BODY MASS INDEX: 27.46 KG/M2 | HEART RATE: 68 BPM | DIASTOLIC BLOOD PRESSURE: 70 MMHG | SYSTOLIC BLOOD PRESSURE: 118 MMHG | OXYGEN SATURATION: 98 %

## 2023-01-06 DIAGNOSIS — F41.1 GENERALIZED ANXIETY DISORDER: ICD-10-CM

## 2023-01-06 DIAGNOSIS — F31.4 BIPOLAR DISORDER, CURRENT EPISODE DEPRESSED, SEVERE, WITHOUT PSYCHOTIC FEATURES (HCC): ICD-10-CM

## 2023-01-06 PROCEDURE — 99214 OFFICE O/P EST MOD 30 MIN: CPT | Performed by: NURSE PRACTITIONER

## 2023-01-06 RX ORDER — DULOXETIN HYDROCHLORIDE 20 MG/1
20 CAPSULE, DELAYED RELEASE ORAL DAILY
Qty: 90 CAPSULE | Refills: 0 | Status: SHIPPED | OUTPATIENT
Start: 2023-01-06

## 2023-01-06 RX ORDER — LAMOTRIGINE 150 MG/1
150 TABLET ORAL DAILY
Qty: 90 TABLET | Refills: 0 | Status: SHIPPED | OUTPATIENT
Start: 2023-01-06

## 2023-01-06 NOTE — PROGRESS NOTES
Chief Complaint   Patient presents with    Follow-up     4 week follow up no issues       History obtained from chart review and the patient. SUBJECTIVE:  Olga Hunter is a 25 y.o. female that presents today for anxiety and depression     She did call [facility] office, left a voice mail to schedule. Hasn't heard back. Anxiety/MDD    Taking 150 mg Lamictal and Cymbalta and doing better. Feeling depressed minimally the last couple of weeks. Denies any SI/HI. Energy/motivation are still pretty low. Mood swings are better, but still happening some. Taking Remeron prn. Sleep is improved some. Anxiety is improved, feels like it is manageable.     She has tried Zoloft, Prozac, Paxil, Lexapro, Buspar, Wellbutrin, Zyprexa, Seroquel, Abilify in the past with minimal results      Age/Gender Health Maintenance    Lipid - 35  DM Screen - 35  Colon Cancer Screening - 48  Lung Cancer Screening (Age 54 to [de-identified] with 30 pack year hx, current smoker or quit within past 15 years) - n/a    Tetanus - needs  Influenza Vaccine - 10/18  Pneumonia Vaccine - 65  Zostavax - 50   HPV Vaccine - needs    Breast Cancer Screening - 50  Cervical Cancer Screening - 21  Osteoporosis Screening - 72  Chlamydia Screen - 16    PSA testing discussion - n/a  AAA Screening - n/a    Falls screening - n/a    Current Outpatient Medications   Medication Sig Dispense Refill    lamoTRIgine (LAMICTAL) 150 MG tablet Take 1 tablet by mouth daily 90 tablet 0    DULoxetine (CYMBALTA) 20 MG extended release capsule Take 1 capsule by mouth daily 90 capsule 0    mirtazapine (REMERON) 7.5 MG tablet Take 1 tablet by mouth nightly 30 tablet 5    XULANE 150-35 MCG/24HR Place 1 patch onto the skin every 7 days 4 patch 11    diphenhydrAMINE (BENADRYL) 25 MG capsule Take 25 mg by mouth every 6 hours as needed for Itching      NONFORMULARY Birth control       ibuprofen (ADVIL;MOTRIN) 200 MG tablet Take 200 mg by mouth every 6 hours as needed for Pain Naproxen Sodium (ALEVE PO) Take by mouth as needed       No current facility-administered medications for this visit. Orders Placed This Encounter   Medications    lamoTRIgine (LAMICTAL) 150 MG tablet     Sig: Take 1 tablet by mouth daily     Dispense:  90 tablet     Refill:  0    DULoxetine (CYMBALTA) 20 MG extended release capsule     Sig: Take 1 capsule by mouth daily     Dispense:  90 capsule     Refill:  0                 All medications reviewed and reconciled, including OTC and herbal medications. Updated list given to patient. Patient Active Problem List   Diagnosis    Major depressive disorder, recurrent episode, moderate (HCC)    Attention deficit hyperactivity disorder (ADHD), predominantly inattentive type    Generalized anxiety disorder    Affective psychosis, bipolar ([facility] [address]       Past Medical History:   Diagnosis Date    ADHD (attention deficit hyperactivity disorder)     Anxiety     Attention deficit hyperactivity disorder (ADHD), predominantly inattentive type 1/16/2018    Blood in stool     Convergent comitant strabismus 2014    Diarrhea     PCOS (polycystic ovarian syndrome)        No past surgical history on file.     No Known Allergies    Social History     Socioeconomic History    Marital status: Single     Spouse name: Not on file    Number of children: Not on file    Years of education: Not on file    Highest education level: Not on file   Occupational History    Not on file   Tobacco Use    Smoking status: Never    Smokeless tobacco: Never   Vaping Use    Vaping Use: Every day    Substances: Nicotine, Flavoring    Devices: Pre-filled or refillable cartridge, Refillable tank   Substance and Sexual Activity    Alcohol use: No    Drug use: No    Sexual activity: Yes     Partners: Male     Comment: uses condoms   Other Topics Concern    Not on file   Social History Narrative    Not on file     Social Determinants of Health     Financial Resource Strain: Low Risk     Difficulty of Paying Living Expenses: Not very hard   Food Insecurity: No Food Insecurity    Worried About Running Out of Food in the Last Year: Never true    Ran Out of Food in the Last Year: Never true   Transportation Needs: Not on file   Physical Activity: Not on file   Stress: Not on file   Social Connections: Not on file   Intimate Partner Violence: Not on file   Housing Stability: Not on file       Family History   Problem Relation Age of Onset    Depression Mother     No Known Problems Father     Allergy (Severe) Maternal Grandmother     Other Maternal Grandmother         fibromyalgia    Depression Maternal Grandmother     Cancer Paternal Grandmother         Pancreatic     Colon Cancer Neg Hx     Colon Polyps Neg Hx          I have reviewed the patient's past medical history, past surgical history, allergies, medications, social and family history and I have made updates where appropriate.       Review of Systems  Positive responses are highlighted in bold    Constitutional:  Fever, Chills, Night Sweats, Fatigue, Unexpected changes in weight  Eyes:  Eye discharge, Eye pain, Eye redness, Visual disturbances   HENT:  Ear pain, Tinnitus, Nosebleeds, Trouble swallowing, Hearing loss, Sore throat  Cardiovascular:  Chest Pain, Palpitations, Orthopnea, Paroxysmal Nocturnal Dyspnea  Respiratory:  Cough, Wheezing, Shortness of breath, Chest tightness, Apnea  Gastrointestinal:  Nausea, Vomiting, Diarrhea, Constipation, Heartburn, Blood in stool  Genitourinary:  Difficulty or painful urination, Flank pain, Change in frequency, Urgency  Skin:  Color change, Rash, Itching, Wound  Psychiatric:  Hallucinations, Anxiety, Depression, Suicidal ideation  Hematological:  Enlarged glands, Easy bleeding, Easily bruising  Musculoskeletal:  Joint pain, Back pain, Gait problems, Joint swelling, Myalgias  Neurological:  Dizziness, Headaches, Presyncope, Numbness, Seizures, Tremors  Allergy:  Environmental allergies, Food allergies  Endocrine:  Heat Intolerance, Cold Intolerance, Polydipsia, Polyphagia, Polyuria      PHYSICAL EXAM:  Vitals:    01/06/23 1103   BP: 118/70   Pulse: 68   Resp: 16   Temp: 97.8 °F (36.6 °C)   SpO2: 98%   Weight: 164 lb 12.8 oz (74.8 kg)   Height: 5' 5\" (1.651 m)       Body mass index is 27.42 kg/m². VS Reviewed  General Appearance: A&O x 3, No acute distress,well developed and well- nourished  Head: normocephalic and atraumatic  Eyes: pupils equal, round, and reactive to light, extraocular eye movements intact, conjunctivae and eye lids without erythema  Neck: supple and non-tender without mass, no thyromegaly or thyroid nodules, no cervical lymphadenopathy  Pulmonary/Chest: clear to auscultation bilaterally- no wheezes, rales or rhonchi, normal air movement, no respiratory distress or retractions  Cardiovascular: S1 and S2 auscultated w/ RRR. No murmurs, rubs, clicks, or gallops, distal pulses intact. Abdomen: soft, non-tender, non-distended, bowl sounds physiologic,  no rebound or guarding, no masses or hernias noted. Liver and spleen without enlargement. Extremities: no cyanosis, clubbing or edema of the lower extremities  Musculoskeletal: No joint swelling or gross deformity   Neuro:  Alert, 5/5 strength globally and symmetrically  Psych: Affect and mood are flat and depressed. Thought process is normal without evidence of psychosis. Good insight and appropriate interaction. Cognition and memory appear to be intact. Skin: warm and dry, no rash  Lymph:  No cervical, auricular or supraclavicular lymph nodes palpated    ASSESSMENT & PLAN  Peggy Gutierrez was seen today for follow-up. Diagnoses and all orders for this visit:    Bipolar disorder, current episode depressed, severe, without psychotic features ([facility] [address]  -     lamoTRIgine (LAMICTAL) 150 MG tablet; Take 1 tablet by mouth daily  -     DULoxetine (CYMBALTA) 20 MG extended release capsule;  Take 1 capsule by mouth daily    Generalized anxiety disorder  -     DULoxetine (CYMBALTA) 20 MG extended release capsule; Take 1 capsule by mouth daily    - con't Lamictal and Cymbalta for now  - moods and anxiety doing decently, stable and manageable  - con't Remeron prn for sleep  - rec'd she give psychiatry a call and schedule as well    DISPOSITION    Return in about 3 months (around 4/6/2023) for anxiety and depression. Susan released without restrictions. PATIENT COUNSELING    Counseling was provided today regarding the following topics: Healthy eating habits, Regular exercise, substance abuse and healthy sleep habits. Susan received counseling on the following healthy behaviors: nutrition    Patient given educational materials on: See Attached    I have instructed Susan to complete a self tracking handout on none and instructed them to bring it with them to her next appointment. Barriers to learning and self management: none    Discussed use, benefit, and side effects of prescribed medications. Barriers to medication compliance addressed. All patient questions answered. Pt voiced understanding.        Electronically signed by ALBERTA Lizama CNP on 1/6/2023 at 11:12 AM

## 2023-05-20 DIAGNOSIS — F31.4 BIPOLAR DISORDER, CURRENT EPISODE DEPRESSED, SEVERE, WITHOUT PSYCHOTIC FEATURES (HCC): ICD-10-CM

## 2023-05-22 RX ORDER — LAMOTRIGINE 150 MG/1
TABLET ORAL
Qty: 90 TABLET | Refills: 0 | Status: SHIPPED | OUTPATIENT
Start: 2023-05-22

## 2023-05-22 NOTE — TELEPHONE ENCOUNTER
Recent Visits  Date Type Provider Dept   01/06/23 Office Visit Malcolm Ariza, APRN - CNP [facility]   12/09/22 Office Visit Malcolm Ariza, APRN - CNP [facility]   10/07/22 Office Visit Malcolm Ariza, APRN - CNP [facility]   09/08/22 Office Visit Malcolm Ariza, APRN - CNP [facility]   05/13/22 Office Visit Malcolm Ariza, APRN - CNP [facility]   03/17/22 Office Visit Malcolm Ariza, APRN - CNP [facility]   02/11/22 Office Visit Malcolm Ariza, APRN - CNP [facility]   12/16/21 Office Visit Malcolm Ariza, APRN - CNP [facility]   Showing recent visits within past 540 days with a meds authorizing provider and meeting all other requirements  Future Appointments  No visits were found meeting these conditions. Showing future appointments within next 150 days with a meds authorizing provider and meeting all other requirements   No future appointments.

## 2023-08-10 DIAGNOSIS — F31.4 BIPOLAR DISORDER, CURRENT EPISODE DEPRESSED, SEVERE, WITHOUT PSYCHOTIC FEATURES (HCC): ICD-10-CM

## 2023-08-10 RX ORDER — LAMOTRIGINE 150 MG/1
TABLET ORAL
Qty: 90 TABLET | Refills: 0 | OUTPATIENT
Start: 2023-08-10

## 2023-08-10 NOTE — TELEPHONE ENCOUNTER
Recent Visits  Date Type Provider Dept   01/06/23 Office Visit ALBERTA Araujo CNP [facility]   12/09/22 Office Visit ALBERTA Araujo CNP [facility]   10/07/22 Office Visit ALBERTA Aruajo CNP [facility]   09/08/22 Office Visit ALBERTA Araujo CNP [facility]   05/13/22 Office Visit ALBERTA Araujo CNP [facility]   03/17/22 Office Visit ALBERTA Araujo - CNP [facility]   Showing recent visits within past 540 days with a meds authorizing provider and meeting all other requirements  Future Appointments  No visits were found meeting these conditions.   Showing future appointments within next 150 days with a meds authorizing provider and meeting all other requirements

## 2023-08-14 RX ORDER — LAMOTRIGINE 150 MG/1
150 TABLET ORAL DAILY
Qty: 30 TABLET | Refills: 0 | Status: SHIPPED | OUTPATIENT
Start: 2023-08-14

## 2023-08-14 NOTE — TELEPHONE ENCOUNTER
I called and spoke to pt and she states that she will have to call back to schedule when her schedule is better. Pt states that she is working 2 jobs right now and currently has no time off but will call soon to schedule.

## 2023-08-14 NOTE — TELEPHONE ENCOUNTER
I sent a 30 day supply of the medication to [facility] for her. This will hopefully hold her over until she is able to come in.

## 2023-09-22 ENCOUNTER — HOSPITAL ENCOUNTER (OUTPATIENT)
Age: 23
Discharge: HOME | End: 2023-09-22
Payer: COMMERCIAL

## 2023-09-22 ENCOUNTER — OFFICE VISIT (OUTPATIENT)
Dept: FAMILY MEDICINE CLINIC | Age: 23
End: 2023-09-22

## 2023-09-22 VITALS
WEIGHT: 164.8 LBS | TEMPERATURE: 98.1 F | BODY MASS INDEX: 27.46 KG/M2 | OXYGEN SATURATION: 98 % | HEART RATE: 76 BPM | DIASTOLIC BLOOD PRESSURE: 70 MMHG | HEIGHT: 65 IN | SYSTOLIC BLOOD PRESSURE: 110 MMHG | RESPIRATION RATE: 12 BRPM

## 2023-09-22 DIAGNOSIS — F17.200 NICOTINE DEPENDENCE DUE TO VAPING NON-TOBACCO PRODUCT: ICD-10-CM

## 2023-09-22 DIAGNOSIS — R73.03 PREDIABETES: ICD-10-CM

## 2023-09-22 DIAGNOSIS — F31.31 BIPOLAR AFFECTIVE DISORDER, CURRENTLY DEPRESSED, MILD (HCC): ICD-10-CM

## 2023-09-22 DIAGNOSIS — Z51.81 THERAPEUTIC DRUG MONITORING: ICD-10-CM

## 2023-09-22 DIAGNOSIS — Z51.81 THERAPEUTIC DRUG MONITORING: Primary | ICD-10-CM

## 2023-09-22 LAB
DEPRECATED MEAN GLUCOSE BLD GHB EST-ACNC: 105 MG/DL (ref 70–126)
HBA1C MFR BLD HPLC: 5.5 % (ref 4.4–6.4)

## 2023-09-22 PROCEDURE — 83036 HEMOGLOBIN GLYCOSYLATED A1C: CPT

## 2023-09-22 PROCEDURE — 36415 COLL VENOUS BLD VENIPUNCTURE: CPT

## 2023-09-22 PROCEDURE — 80175 DRUG SCREEN QUAN LAMOTRIGINE: CPT

## 2023-09-22 RX ORDER — LAMOTRIGINE 150 MG/1
150 TABLET ORAL NIGHTLY
Qty: 90 TABLET | Refills: 1 | Status: SHIPPED | OUTPATIENT
Start: 2023-09-22

## 2023-09-22 RX ORDER — NICOTINE 21 MG/24HR
1 PATCH, TRANSDERMAL 24 HOURS TRANSDERMAL DAILY
Qty: 42 PATCH | Refills: 0 | Status: SHIPPED | OUTPATIENT
Start: 2023-09-22 | End: 2023-11-03

## 2023-09-22 SDOH — ECONOMIC STABILITY: HOUSING INSECURITY
IN THE LAST 12 MONTHS, WAS THERE A TIME WHEN YOU DID NOT HAVE A STEADY PLACE TO SLEEP OR SLEPT IN A SHELTER (INCLUDING NOW)?: NO

## 2023-09-22 SDOH — ECONOMIC STABILITY: FOOD INSECURITY: WITHIN THE PAST 12 MONTHS, THE FOOD YOU BOUGHT JUST DIDN'T LAST AND YOU DIDN'T HAVE MONEY TO GET MORE.: NEVER TRUE

## 2023-09-22 SDOH — ECONOMIC STABILITY: INCOME INSECURITY: HOW HARD IS IT FOR YOU TO PAY FOR THE VERY BASICS LIKE FOOD, HOUSING, MEDICAL CARE, AND HEATING?: NOT HARD AT ALL

## 2023-09-22 SDOH — ECONOMIC STABILITY: FOOD INSECURITY: WITHIN THE PAST 12 MONTHS, YOU WORRIED THAT YOUR FOOD WOULD RUN OUT BEFORE YOU GOT MONEY TO BUY MORE.: NEVER TRUE

## 2023-09-22 ASSESSMENT — PATIENT HEALTH QUESTIONNAIRE - PHQ9
5. POOR APPETITE OR OVEREATING: 1
10. IF YOU CHECKED OFF ANY PROBLEMS, HOW DIFFICULT HAVE THESE PROBLEMS MADE IT FOR YOU TO DO YOUR WORK, TAKE CARE OF THINGS AT HOME, OR GET ALONG WITH OTHER PEOPLE: 1
1. LITTLE INTEREST OR PLEASURE IN DOING THINGS: 1
6. FEELING BAD ABOUT YOURSELF - OR THAT YOU ARE A FAILURE OR HAVE LET YOURSELF OR YOUR FAMILY DOWN: 2
2. FEELING DOWN, DEPRESSED OR HOPELESS: 1
8. MOVING OR SPEAKING SO SLOWLY THAT OTHER PEOPLE COULD HAVE NOTICED. OR THE OPPOSITE, BEING SO FIGETY OR RESTLESS THAT YOU HAVE BEEN MOVING AROUND A LOT MORE THAN USUAL: 0
9. THOUGHTS THAT YOU WOULD BE BETTER OFF DEAD, OR OF HURTING YOURSELF: 0
SUM OF ALL RESPONSES TO PHQ QUESTIONS 1-9: 12
3. TROUBLE FALLING OR STAYING ASLEEP: 3
SUM OF ALL RESPONSES TO PHQ9 QUESTIONS 1 & 2: 2
SUM OF ALL RESPONSES TO PHQ QUESTIONS 1-9: 12
4. FEELING TIRED OR HAVING LITTLE ENERGY: 2
SUM OF ALL RESPONSES TO PHQ QUESTIONS 1-9: 12
SUM OF ALL RESPONSES TO PHQ QUESTIONS 1-9: 12
7. TROUBLE CONCENTRATING ON THINGS, SUCH AS READING THE NEWSPAPER OR WATCHING TELEVISION: 2

## 2023-09-22 NOTE — PROGRESS NOTES
psychosis. Good insight and appropriate interaction. Cognition and memory appear to be intact. Skin: warm and dry, no rash  Lymph:  No cervical, auricular or supraclavicular lymph nodes palpated    ASSESSMENT & PLAN  Elvin Amanda was seen today for follow-up. Diagnoses and all orders for this visit:    Therapeutic drug monitoring  -     Lamotrigine Level; Future    Bipolar affective disorder, currently depressed, mild (HCC)  -     lamoTRIgine (LAMICTAL) 150 MG tablet; Take 1 tablet by mouth nightly  -     Lamotrigine Level; Future    Prediabetes  -     Hemoglobin A1C; Future    Nicotine dependence due to vaping non-tobacco product  -     nicotine (NICODERM CQ) 21 MG/24HR; Place 1 patch onto the skin daily  -     VT TOBACCO USE CESSATION INTERMEDIATE 3-10 MINUTES      - con't Lamictal 150 mg for now  - declines need for med adjustment, feels like things are manageable  - obtain Lamotrigine and A1C labs  - start Nicoderm 21 mg patch for nicotine cessation    On the date of the visit, I have spent 3 minutes in face to face time with patient counseling on smoking cessation. DISPOSITION    Return in about 6 months (around 3/22/2024) for anxiety and depression. Susan released without restrictions. PATIENT COUNSELING    Counseling was provided today regarding the following topics: Healthy eating habits, Regular exercise, substance abuse and healthy sleep habits. Susan received counseling on the following healthy behaviors: nutrition    Patient given educational materials on: See Attached    I have instructed Susan to complete a self tracking handout on none and instructed them to bring it with them to her next appointment. Barriers to learning and self management: none    Discussed use, benefit, and side effects of prescribed medications. Barriers to medication compliance addressed. All patient questions answered. Pt voiced understanding.        Electronically signed by ALBERTA Driver - CNP

## 2023-09-24 LAB — LAMOTRIGINE SERPL-MCNC: 6.8 UG/ML (ref 3–15)

## 2023-09-25 ENCOUNTER — TELEPHONE (OUTPATIENT)
Dept: FAMILY MEDICINE CLINIC | Age: 23
End: 2023-09-25

## 2023-09-25 NOTE — TELEPHONE ENCOUNTER
----- Message from ALBERTA Cancino CNP sent at 9/25/2023  7:37 AM EDT -----  Let Salty Daria know her labs are in normal ranges.

## 2024-05-02 DIAGNOSIS — F31.31 BIPOLAR AFFECTIVE DISORDER, CURRENTLY DEPRESSED, MILD (HCC): ICD-10-CM

## 2024-05-02 RX ORDER — LAMOTRIGINE 150 MG/1
150 TABLET ORAL NIGHTLY
Qty: 90 TABLET | Refills: 0 | Status: SHIPPED | OUTPATIENT
Start: 2024-05-02

## 2024-05-02 NOTE — TELEPHONE ENCOUNTER
Recent Visits  Date Type Provider Dept   09/22/23 Office Visit Juanjose Monaco APRN - CNP [facility]   01/06/23 Office Visit Juanjose Monaco APRN - CNP [facility]   12/09/22 Office Visit Juanjoes Monaco APRN - CNP [facility]   Showing recent visits within past 540 days with a meds authorizing provider and meeting all other requirements  Future Appointments  No visits were found meeting these conditions.  Showing future appointments within next 150 days with a meds authorizing provider and meeting all other requirements

## 2024-06-04 NOTE — TELEPHONE ENCOUNTER
2nd attempt to contact the pt re:overdue labs [facility] ordered on 11/13/18. HIPAA form is up to date, orders mailed.
Daughter

## 2024-07-25 DIAGNOSIS — F31.31 BIPOLAR AFFECTIVE DISORDER, CURRENTLY DEPRESSED, MILD (HCC): ICD-10-CM

## 2024-07-25 RX ORDER — LAMOTRIGINE 150 MG/1
150 TABLET ORAL NIGHTLY
Qty: 90 TABLET | Refills: 0 | Status: SHIPPED | OUTPATIENT
Start: 2024-07-25

## 2024-07-25 NOTE — TELEPHONE ENCOUNTER
Recent Visits  Date Type Provider Dept   09/22/23 Office Visit Juanjose Monaco, APRN - CNP [facility]   Showing recent visits within past 540 days with a meds authorizing provider and meeting all other requirements  Future Appointments  No visits were found meeting these conditions.  Showing future appointments within next 150 days with a meds authorizing provider and meeting all other requirements

## 2024-08-06 ENCOUNTER — OFFICE VISIT (OUTPATIENT)
Dept: FAMILY MEDICINE CLINIC | Age: 24
End: 2024-08-06
Payer: COMMERCIAL

## 2024-08-06 VITALS
TEMPERATURE: 97.6 F | BODY MASS INDEX: 32.92 KG/M2 | WEIGHT: 197.6 LBS | OXYGEN SATURATION: 99 % | RESPIRATION RATE: 16 BRPM | SYSTOLIC BLOOD PRESSURE: 138 MMHG | DIASTOLIC BLOOD PRESSURE: 74 MMHG | HEIGHT: 65 IN | HEART RATE: 72 BPM

## 2024-08-06 DIAGNOSIS — F31.31 BIPOLAR AFFECTIVE DISORDER, CURRENTLY DEPRESSED, MILD (HCC): Primary | ICD-10-CM

## 2024-08-06 PROCEDURE — 99214 OFFICE O/P EST MOD 30 MIN: CPT | Performed by: NURSE PRACTITIONER

## 2024-08-06 ASSESSMENT — PATIENT HEALTH QUESTIONNAIRE - PHQ9
3. TROUBLE FALLING OR STAYING ASLEEP: NEARLY EVERY DAY
10. IF YOU CHECKED OFF ANY PROBLEMS, HOW DIFFICULT HAVE THESE PROBLEMS MADE IT FOR YOU TO DO YOUR WORK, TAKE CARE OF THINGS AT HOME, OR GET ALONG WITH OTHER PEOPLE: VERY DIFFICULT
SUM OF ALL RESPONSES TO PHQ QUESTIONS 1-9: 22
8. MOVING OR SPEAKING SO SLOWLY THAT OTHER PEOPLE COULD HAVE NOTICED. OR THE OPPOSITE, BEING SO FIGETY OR RESTLESS THAT YOU HAVE BEEN MOVING AROUND A LOT MORE THAN USUAL: NEARLY EVERY DAY
SUM OF ALL RESPONSES TO PHQ QUESTIONS 1-9: 22
2. FEELING DOWN, DEPRESSED OR HOPELESS: MORE THAN HALF THE DAYS
SUM OF ALL RESPONSES TO PHQ QUESTIONS 1-9: 22
5. POOR APPETITE OR OVEREATING: SEVERAL DAYS
9. THOUGHTS THAT YOU WOULD BE BETTER OFF DEAD, OR OF HURTING YOURSELF: SEVERAL DAYS
4. FEELING TIRED OR HAVING LITTLE ENERGY: NEARLY EVERY DAY
SUM OF ALL RESPONSES TO PHQ9 QUESTIONS 1 & 2: 5
1. LITTLE INTEREST OR PLEASURE IN DOING THINGS: NEARLY EVERY DAY
6. FEELING BAD ABOUT YOURSELF - OR THAT YOU ARE A FAILURE OR HAVE LET YOURSELF OR YOUR FAMILY DOWN: NEARLY EVERY DAY
7. TROUBLE CONCENTRATING ON THINGS, SUCH AS READING THE NEWSPAPER OR WATCHING TELEVISION: NEARLY EVERY DAY
SUM OF ALL RESPONSES TO PHQ QUESTIONS 1-9: 21

## 2024-08-06 ASSESSMENT — COLUMBIA-SUICIDE SEVERITY RATING SCALE - C-SSRS
4. HAVE YOU HAD THESE THOUGHTS AND HAD SOME INTENTION OF ACTING ON THEM?: NO
2. HAVE YOU ACTUALLY HAD ANY THOUGHTS OF KILLING YOURSELF?: YES
5. HAVE YOU STARTED TO WORK OUT OR WORKED OUT THE DETAILS OF HOW TO KILL YOURSELF? DO YOU INTEND TO CARRY OUT THIS PLAN?: NO
3. HAVE YOU BEEN THINKING ABOUT HOW YOU MIGHT KILL YOURSELF?: NO
1. WITHIN THE PAST MONTH, HAVE YOU WISHED YOU WERE DEAD OR WISHED YOU COULD GO TO SLEEP AND NOT WAKE UP?: YES
6. HAVE YOU EVER DONE ANYTHING, STARTED TO DO ANYTHING, OR PREPARED TO DO ANYTHING TO END YOUR LIFE?: NO

## 2024-08-06 NOTE — PROGRESS NOTES
Chief Complaint   Patient presents with    Anxiety     Started worsening a couple of months ago. States just happens doesn't know anything that makes it worse. States wanting to get on a medication to help with this. Eventually would like to see a counselor       History obtained from chart review and the patient.    SUBJECTIVE:  Susan Monge is a 23 y.o. female that presents today for anxiety and depression     Anxiety/MDD  Struggling with worsening depression and anxiety for several months  She does struggle with suicidal ideations, maybe once a week  Denies any plan or intent, able to dismiss them  Energy/motivation are low  Sleep is still a struggle  Appetite is fair    She feels like in the past, the combo of Zoloft and Wellbutrin worked the best for her, would like to try the Zoloft again    She has tried Zoloft, Prozac, Paxil, Lexapro, Buspar, Wellbutrin, Zyprexa, Seroquel, Abilify in the past with minimal results    Age/Gender Health Maintenance    Lipid - 35  DM Screen - 35  Colon Cancer Screening - 50  Lung Cancer Screening (Age 55 to 80 with 30 pack year hx, current smoker or quit within past 15 years) - n/a    Tetanus - needs  Influenza Vaccine - 10/18  Pneumonia Vaccine - 65  Zostavax - 50   HPV Vaccine - needs    Breast Cancer Screening - 50  Cervical Cancer Screening - 21  Osteoporosis Screening - 65  Chlamydia Screen - 16    PSA testing discussion - n/a  AAA Screening - n/a    Falls screening - n/a    Current Outpatient Medications   Medication Sig Dispense Refill    aspirin-acetaminophen-caffeine (EXCEDRIN MIGRAINE) 250-250-65 MG per tablet Take 1 tablet by mouth every 6 hours as needed for Headaches or Pain      sertraline (ZOLOFT) 50 MG tablet Take 1 tablet by mouth daily 30 tablet 1    lamoTRIgine (LAMICTAL) 150 MG tablet Take 1 tablet by mouth nightly 90 tablet 0    ibuprofen (ADVIL;MOTRIN) 200 MG tablet Take 1 tablet by mouth every 6 hours as needed for Pain      Naproxen Sodium (ALEVE

## 2024-10-18 DIAGNOSIS — F31.31 BIPOLAR AFFECTIVE DISORDER, CURRENTLY DEPRESSED, MILD (HCC): ICD-10-CM

## 2024-10-18 RX ORDER — LAMOTRIGINE 150 MG/1
150 TABLET ORAL NIGHTLY
Qty: 90 TABLET | Refills: 0 | Status: SHIPPED | OUTPATIENT
Start: 2024-10-18

## 2024-10-18 NOTE — TELEPHONE ENCOUNTER
Recent Visits  Date Type Provider Dept   08/06/24 Office Visit Juanjose Monaco APRN - CNP [facility]   09/22/23 Office Visit Juanjose Monaco APRN - CNP [facility]   Showing recent visits within past 540 days with a meds authorizing provider and meeting all other requirements  Future Appointments  No visits were found meeting these conditions.  Showing future appointments within next 150 days with a meds authorizing provider and meeting all other requirements

## 2025-01-15 DIAGNOSIS — F31.31 BIPOLAR AFFECTIVE DISORDER, CURRENTLY DEPRESSED, MILD (HCC): ICD-10-CM

## 2025-01-15 RX ORDER — LAMOTRIGINE 150 MG/1
150 TABLET ORAL NIGHTLY
Qty: 90 TABLET | Refills: 0 | Status: SHIPPED | OUTPATIENT
Start: 2025-01-15

## 2025-05-08 DIAGNOSIS — F31.31 BIPOLAR AFFECTIVE DISORDER, CURRENTLY DEPRESSED, MILD (HCC): ICD-10-CM

## 2025-05-09 RX ORDER — LAMOTRIGINE 150 MG/1
150 TABLET ORAL NIGHTLY
Qty: 90 TABLET | Refills: 0 | Status: SHIPPED | OUTPATIENT
Start: 2025-05-09